# Patient Record
Sex: FEMALE | Race: BLACK OR AFRICAN AMERICAN
[De-identification: names, ages, dates, MRNs, and addresses within clinical notes are randomized per-mention and may not be internally consistent; named-entity substitution may affect disease eponyms.]

---

## 2019-10-08 ENCOUNTER — HOSPITAL ENCOUNTER (EMERGENCY)
Dept: HOSPITAL 62 - ER | Age: 24
Discharge: HOME | End: 2019-10-08
Payer: SELF-PAY

## 2019-10-08 VITALS — DIASTOLIC BLOOD PRESSURE: 88 MMHG | SYSTOLIC BLOOD PRESSURE: 124 MMHG

## 2019-10-08 DIAGNOSIS — F17.200: ICD-10-CM

## 2019-10-08 DIAGNOSIS — N92.6: ICD-10-CM

## 2019-10-08 DIAGNOSIS — R30.0: ICD-10-CM

## 2019-10-08 DIAGNOSIS — R31.9: ICD-10-CM

## 2019-10-08 DIAGNOSIS — R10.30: ICD-10-CM

## 2019-10-08 DIAGNOSIS — N39.0: Primary | ICD-10-CM

## 2019-10-08 LAB
APPEARANCE UR: (no result)
APTT PPP: (no result) S
BILIRUB UR QL STRIP: NEGATIVE
GLUCOSE UR STRIP-MCNC: NEGATIVE MG/DL
KETONES UR STRIP-MCNC: 20 MG/DL
NITRITE UR QL STRIP: POSITIVE
PH UR STRIP: 6 [PH] (ref 5–9)
PROT UR STRIP-MCNC: 100 MG/DL
SP GR UR STRIP: 1.02
UROBILINOGEN UR-MCNC: 4 MG/DL (ref ?–2)

## 2019-10-08 PROCEDURE — 99283 EMERGENCY DEPT VISIT LOW MDM: CPT

## 2019-10-08 PROCEDURE — 81025 URINE PREGNANCY TEST: CPT

## 2019-10-08 PROCEDURE — 81001 URINALYSIS AUTO W/SCOPE: CPT

## 2019-10-08 NOTE — ER DOCUMENT REPORT
ED GI/





- General


Chief Complaint: Abdominal Pain


Stated Complaint: NAUSEA,STOMACH PAIN


Time Seen by Provider: 10/08/19 03:21


Primary Care Provider: 


TRACY TONY MD [ACTIVE STAFF] - Follow up as needed


Mode of Arrival: Ambulatory


Information source: Patient, Friend


Notes: 





HISTORY OF PRESENT ILLNESS:





Patient is a 24-year-old female with no significant past medical history who 

presents with lower abdominal cramping with hematuria and dysuria for the past 2

to 3 days.  Patient reports that she is trying to get pregnant and took a home 

pregnancy test that was negative, denies vaginal bleeding or discharge.





Location: Lower abdomen


Onset: 2 days ago


Alleviation: None


Provocation: Urination


Quality: Aching, burning


Radiation: None


Severity: Mild


Timing: Constant


History of abdominal surgery: None


Associated symptoms: Denies fevers or chills, no vaginal bleeding or discharge, 

no injuries, no nausea or vomiting, no diarrhea


Last bowel movement: Today and normal


Last menstrual period: Approximately 5 weeks ago











REVIEW OF SYSTEMS:





CONSTITUTIONAL : Denies fever or chills, no sweats.  Denies recent illness.


EENT:   Denies eye, ear, throat, or mouth pain or symptoms.  Denies nasal or 

sinus congestion.


CARDIOVASCULAR: Denies chest pain.  Denies swelling of the legs.


RESPIRATORY: Denies cough, cold, or chest congestion.  Denies shortness of 

breath or difficulty breathing.  Denies wheezing.


GASTROINTESTINAL: Positive for abdominal pain.  Denies nausea, vomiting, or 

diarrhea.  Denies constipation. 


GENITOURINARY: Positive for hematuria and dysuria.


FEMALE  GENITOURINARY:  Denies vaginal bleeding, abnormal or irregular periods.


MUSCULOSKELETAL:  Denies neck or back pain or joint pain or swelling.


SKIN:   Denies rash or skin lesions.


HEMATOLOGIC :   Denies easy bruising or bleeding.


LYMPHATIC:  Denies swollen, enlarged glands.


NEUROLOGICAL:  Denies altered mental status or loss of consciousness.  Denies 

headache.  Denies weakness or paralysis or loss of use of either side.  Denies 

problems with gait or speech.  Denies sensory or motor loss.


PSYCHIATRIC:  Denies anxiety or stress or depression.





All other systems reviewed and negative.











PHYSICAL EXAMINATION:





GENERAL: Well-appearing, well-nourished and in no acute distress.


HEAD: Atraumatic, normocephalic. No scalp deformity, depression, or crepitance.


EYES: Pupils are 3 mm and equal/round/reactive to light, extraocular movements 

intact, sclera anicteric, conjunctiva are normal.


ENT: Nares patent bilaterally, oropharynx.  Moist mucous membranes. No tonsil 

hypertrophy.


NECK: Normal range of motion, supple without lymphadenopathy.


LUNGS: Breath sounds present, equal, and clear to auscultation bilaterally.  No 

wheezes, rales, or rhonchi.


HEART: Regular rate and rhythm without murmurs, rubs, or gallops.  2+ peripheral

pulses.  Normal capillary refill.


ABDOMEN: Soft, mild suprapubic tenderness, nondistended. Normoactive bowel 

sounds.  No guarding, no rebound.  No masses appreciated.


BACK: Normal contour, no midline tenderness. Rectal exam deferred.


GENITAL/PELVIC: Deferred.


EXTREMITIES: Normal range of motion, no pitting or edema.  No cyanosis.


NEUROLOGICAL: No focal neurological deficits. Moves all extremities 

spontaneously and on command.


PSYCH: Normal mood, normal affect.  No suicidal thoughts/ideations.  No 

homicidal thoughts/ideations.  No hallucinations.


SKIN: Warm, dry, normal turgor, no rashes or lesions noted.











ASSESSMENT AND PLAN:





This patient is a 24-year-old female who presents with lower abdominal cramping 

with hematuria and dysuria, most consistent with UTI versus cystitis versus 

pregnancy.





1. Will obtain urine, pregnancy test, and reassess.


2. Will treat infection as appropriate and obtain ultrasound if pregnancy test 

is positive.


TRAVEL OUTSIDE OF THE [place] IN LAST 30 DAYS: No





- HPI


Patient complains to provider of: Abdominal pain, Missed/Late menses


Onset: This morning


Timing/Duration: Sudden


Quality of pain: Achy, Cramping


Severity at maximum: Moderate


Severity in ED: Mild


Pain Level: 1


Location: Suprapubic, Pelvis


Vaginal bleeding (Compared to normal period): Spotting, Lighter


Menstrual period history: Irregular


Sexual history: Active


Associated symptoms: Nausea, Vomiting


Exacerbated by: Denies


Relieved by: Denies


Similar symptoms previously: No


Recently seen / treated by doctor: No





- Related Data


Allergies/Adverse Reactions: 


                                        





No Known Allergies Allergy (Verified 10/08/19 01:13)


   











Past Medical History





- General


Information source: Patient





- Social History


Smoking Status: Current Every Day Smoker


Chew tobacco use (# tins/day): No


Frequency of alcohol use: None


Drug Abuse: None


Lives with: Friend


Family History: Reviewed & Not Pertinent


Patient has suicidal ideation: No


Patient has homicidal ideation: No





- Past Medical History


Cardiac Medical History: Reports: None


Pulmonary Medical History: Reports: None


EENT Medical History: Reports: None


Neurological Medical History: Reports: None


Endocrine Medical History: Reports: None


Renal/ Medical History: Reports: None


Malignancy Medical History: Reports: None


GI Medical History: Reports: None


Musculoskeletal Medical History: Reports None


Skin Medical History: Reports None


Psychiatric Medical History: Reports: None


Traumatic Medical History: Reports: None


Infectious Medical History: Reports: None


Past Surgical History: Reports: Hx Kidney (Renal Surgery) - 2017





- Immunizations


Immunizations up to date: Yes


Hx Diphtheria, Pertussis, Tetanus Vaccination: Yes





Review of Systems





- Review of Systems


Constitutional: No symptoms reported


EENT: No symptoms reported


Cardiovascular: No symptoms reported


Respiratory: No symptoms reported


Gastrointestinal: See HPI, Abdominal pain, Nausea, Vomiting


Genitourinary: No symptoms reported


Female Genitourinary: See HPI, Last menstrual period, Irregular period


Musculoskeletal: No symptoms reported


Skin: No symptoms reported


Hematologic/Lymphatic: No symptoms reported


Neurological/Psychological: No symptoms reported


-: Yes All other systems reviewed and negative





Physical Exam





- Vital signs


Vitals: 


                                        











Temp Pulse Resp BP Pulse Ox


 


 98.7 F   86   16   124/88 H  100 


 


 10/08/19 00:46  10/08/19 00:46  10/08/19 00:46  10/08/19 00:46  10/08/19 00:46











Interpretation: Normal





Course





- Re-evaluation


Re-evalutation: 





10/08/19 03:56


Patient had a negative pregnancy test, however does have a nitrite positive UTI.

 She was given oral Cipro.  Will discharge the patient home with strict return 

precautions and follow-up with OB/GYN. All results were explained to and 

discussed with the patient, and all questions addressed and answered. The 

patient voices both understanding and agreeing with the plan.





- Vital Signs


Vital signs: 


                                        











Temp Pulse Resp BP Pulse Ox


 


 98.7 F   86   16   124/88 H  100 


 


 10/08/19 00:46  10/08/19 00:46  10/08/19 00:46  10/08/19 00:46  10/08/19 00:46














- Laboratory


Laboratory results interpreted by me: 


                                        











  10/08/19





  01:20


 


Urine Protein  100 H


 


Urine Ketones  20 H


 


Urine Blood  LARGE H


 


Urine Nitrite  POSITIVE H


 


Urine Urobilinogen  4.0 H


 


Ur Leukocyte Esterase  LARGE H














Discharge





- Discharge


Clinical Impression: 


Urinary tract infection


Qualifiers:


 Urinary tract infection type: site unspecified Hematuria presence: without 

hematuria Qualified Code(s): N39.0 - Urinary tract infection, site not specified





Condition: Good


Disposition: HOME, SELF-CARE


Instructions:  Urinary Tract Infection ([facility])


Additional Instructions: 


You have been evaluated in the Emergency Department for abdominal pain.  While 

here, you had urinalysis that revealed a urinary tract infection and it is now 

safe to be discharged home.  Please follow-up with your OB/GYN as instructed in 

one week to be rechecked. Return to the Emergency Department if you experience 

worsening pain, irregular periods, vaginal discharge, or any other concerning 

symptoms.


Prescriptions: 


Ondansetron [Zofran Odt 4 mg Tablet] 1 tab PO Q8HP PRN #30 tab.rapdis


 PRN Reason: For Nausea/Vomiting


Ciprofloxacin HCl [Cipro 500 mg Tablet] 500 mg PO BID #20 tablet


Phenazopyridine HCl [Pyridium 100 Mg Tablet] 100 mg PO TID #12 tablet


Referrals: 


TRACY TONY MD [ACTIVE STAFF] - Follow up as needed


Print Language: English

## 2019-12-20 ENCOUNTER — HOSPITAL ENCOUNTER (EMERGENCY)
Dept: HOSPITAL 62 - ER | Age: 24
Discharge: HOME | End: 2019-12-20
Payer: SELF-PAY

## 2019-12-20 VITALS — SYSTOLIC BLOOD PRESSURE: 132 MMHG | DIASTOLIC BLOOD PRESSURE: 52 MMHG

## 2019-12-20 DIAGNOSIS — F12.10: ICD-10-CM

## 2019-12-20 DIAGNOSIS — N94.6: Primary | ICD-10-CM

## 2019-12-20 DIAGNOSIS — M54.5: ICD-10-CM

## 2019-12-20 DIAGNOSIS — D64.9: ICD-10-CM

## 2019-12-20 DIAGNOSIS — R11.0: ICD-10-CM

## 2019-12-20 DIAGNOSIS — R10.9: ICD-10-CM

## 2019-12-20 DIAGNOSIS — F17.290: ICD-10-CM

## 2019-12-20 DIAGNOSIS — Z87.442: ICD-10-CM

## 2019-12-20 LAB
ADD MANUAL DIFF: NO
ALBUMIN SERPL-MCNC: 4.7 G/DL (ref 3.5–5)
ALP SERPL-CCNC: 104 U/L (ref 38–126)
ANION GAP SERPL CALC-SCNC: 13 MMOL/L (ref 5–19)
APPEARANCE UR: (no result)
APTT PPP: YELLOW S
AST SERPL-CCNC: 32 U/L (ref 14–36)
BASOPHILS # BLD AUTO: 0.1 10^3/UL (ref 0–0.2)
BASOPHILS NFR BLD AUTO: 0.9 % (ref 0–2)
BILIRUB DIRECT SERPL-MCNC: 0.3 MG/DL (ref 0–0.4)
BILIRUB SERPL-MCNC: 0.6 MG/DL (ref 0.2–1.3)
BILIRUB UR QL STRIP: NEGATIVE
BUN SERPL-MCNC: 12 MG/DL (ref 7–20)
CALCIUM: 9.8 MG/DL (ref 8.4–10.2)
CHLORIDE SERPL-SCNC: 105 MMOL/L (ref 98–107)
CO2 SERPL-SCNC: 22 MMOL/L (ref 22–30)
EOSINOPHIL # BLD AUTO: 0 10^3/UL (ref 0–0.6)
EOSINOPHIL NFR BLD AUTO: 0.4 % (ref 0–6)
ERYTHROCYTE [DISTWIDTH] IN BLOOD BY AUTOMATED COUNT: 20.1 % (ref 11.5–14)
GLUCOSE SERPL-MCNC: 75 MG/DL (ref 75–110)
GLUCOSE UR STRIP-MCNC: NEGATIVE MG/DL
HCT VFR BLD CALC: 32.1 % (ref 36–47)
HGB BLD-MCNC: 10.2 G/DL (ref 12–15.5)
KETONES UR STRIP-MCNC: 20 MG/DL
LYMPHOCYTES # BLD AUTO: 2.7 10^3/UL (ref 0.5–4.7)
LYMPHOCYTES NFR BLD AUTO: 33.1 % (ref 13–45)
MCH RBC QN AUTO: 23.1 PG (ref 27–33.4)
MCHC RBC AUTO-ENTMCNC: 31.7 G/DL (ref 32–36)
MCV RBC AUTO: 73 FL (ref 80–97)
MONOCYTES # BLD AUTO: 0.6 10^3/UL (ref 0.1–1.4)
MONOCYTES NFR BLD AUTO: 7.6 % (ref 3–13)
NEUTROPHILS # BLD AUTO: 4.7 10^3/UL (ref 1.7–8.2)
NEUTS SEG NFR BLD AUTO: 58 % (ref 42–78)
PH UR STRIP: 7 [PH] (ref 5–9)
PLATELET # BLD: 566 10^3/UL (ref 150–450)
POTASSIUM SERPL-SCNC: 4.1 MMOL/L (ref 3.6–5)
PROT SERPL-MCNC: 8.6 G/DL (ref 6.3–8.2)
PROT UR STRIP-MCNC: 100 MG/DL
RBC # BLD AUTO: 4.42 10^6/UL (ref 3.72–5.28)
SP GR UR STRIP: 1.02
TOTAL CELLS COUNTED % (AUTO): 100 %
UROBILINOGEN UR-MCNC: 2 MG/DL (ref ?–2)
WBC # BLD AUTO: 8.1 10^3/UL (ref 4–10.5)

## 2019-12-20 PROCEDURE — 80053 COMPREHEN METABOLIC PANEL: CPT

## 2019-12-20 PROCEDURE — 76770 US EXAM ABDO BACK WALL COMP: CPT

## 2019-12-20 PROCEDURE — 81001 URINALYSIS AUTO W/SCOPE: CPT

## 2019-12-20 PROCEDURE — 87086 URINE CULTURE/COLONY COUNT: CPT

## 2019-12-20 PROCEDURE — 96374 THER/PROPH/DIAG INJ IV PUSH: CPT

## 2019-12-20 PROCEDURE — 85025 COMPLETE CBC W/AUTO DIFF WBC: CPT

## 2019-12-20 PROCEDURE — 36415 COLL VENOUS BLD VENIPUNCTURE: CPT

## 2019-12-20 PROCEDURE — 84703 CHORIONIC GONADOTROPIN ASSAY: CPT

## 2019-12-20 PROCEDURE — 87186 SC STD MICRODIL/AGAR DIL: CPT

## 2019-12-20 PROCEDURE — 99284 EMERGENCY DEPT VISIT MOD MDM: CPT

## 2019-12-20 PROCEDURE — S0119 ONDANSETRON 4 MG: HCPCS

## 2019-12-20 PROCEDURE — 87088 URINE BACTERIA CULTURE: CPT

## 2019-12-20 NOTE — ER DOCUMENT REPORT
ED Medical Screen (RME)





- General


Chief Complaint: Abdominal Cramping


Stated Complaint: ABDOMINAL CRAMPING


Time Seen by Provider: 12/20/19 12:53


Mode of Arrival: Ambulatory


Notes: 





24-year-old female presented to ED for complaint of right flank pain.  She 

states she started her menstrual cycle this morning but this morning at work she

became very dizzy feeling hot flushed with right flank pain.  She states her 

pain right now is a level 3 and cramping.  She states she does take iron 

medications.  She states she usually has some cramping with her cycle but not 

flank pain.  Patient is tender to palpation to the right flank area.














I have greeted and performed a rapid initial assessment of this patient.  A 

comprehensive ED assessment and evaluation of the patient, analysis of test 

results and completion of medical decision making process will be conducted by 

an additional ED providers.


TRAVEL OUTSIDE OF THE [place] IN LAST 30 DAYS: No





- Related Data


Allergies/Adverse Reactions: 


                                        





No Known Allergies Allergy (Verified 10/08/19 01:13)


   











Past Medical History





- General


Information source: Patient - Small to small





- Social History


Cigarette use (# per day): No


Frequency of alcohol use: None


Drug Abuse: Marijuana


Occupation: Customer service


Lives with: Spouse/Significant other


Family history: Reviewed & Not Pertinent





- Past Medical History


Cardiac Medical History: Reports: None


Pulmonary Medical History: Reports: None


EENT Medical History: Reports: None


Neurological Medical History: Reports: None


Endocrine Medical History: Reports: None


Renal/ Medical History: Reports: Hx Kidney Stones


Malignancy Medical History: Reports: None


GI Medical History: Reports: None


Musculoskeltal Medical History: Reports None


Skin Medical History: Reports None


Psychiatric Medical History: Reports: None


Traumatic Medical History: Reports: None


Infectious Medical History: Reports: None


Past Surgical History: Reports: Hx Kidney (Renal Surgery) - 2017 with episodes 

kidney stone





- Immunizations


Immunizations up to date: Yes


Hx Diphtheria, Pertussis, Tetanus Vaccination: Yes





Physical Exam





- Vital signs


Vitals: 





                                        











Temp Pulse Resp BP Pulse Ox


 


 98.0 F   86   18   132/52 H  100 


 


 12/20/19 12:23  12/20/19 12:23  12/20/19 12:23  12/20/19 12:23  12/20/19 12:23














Course





- Vital Signs


Vital signs: 





                                        











Temp Pulse Resp BP Pulse Ox


 


 98.0 F   86   18   132/52 H  100 


 


 12/20/19 12:23  12/20/19 12:23  12/20/19 12:23  12/20/19 12:23  12/20/19 12:23

## 2019-12-20 NOTE — RADIOLOGY REPORT (SQ)
EXAM DESCRIPTION:  U/S RETROPERITON (RENAL/AORTA)



COMPLETED DATE/TIME:  12/20/2019 2:12 pm



REASON FOR STUDY:  right flank pain



COMPARISON:  None.



TECHNIQUE:  Dynamic and static grayscale images acquired of the kidneys and bladder and recorded on P
ACS. Additional selected color Doppler and spectral images recorded.



LIMITATIONS:  None.



FINDINGS:  RIGHT KIDNEY: The right kidney measures 9.2 cm in length.  The echotexture of the parenchy
ma is normal and the corticomedullary differentiation is preserved.  There is no hydronephrosis, mass
 or calcification.

LEFT KIDNEY:  The left kidney measures 8.6 cm in length.  The echotexture of the parenchyma is normal
 and the corticomedullary differentiation is preserved.  There is no hydronephrosis, mass or calcific
ation.

BLADDER: Limited evaluation of the nondistended urinary bladder.

OTHER FINDINGS: No other finding.



IMPRESSION:  1. No abnormality of the kidneys.

2. Limited evaluation of the nondistended urinary bladder.



TECHNICAL DOCUMENTATION:  JOB ID:  [id number]

 2011 [employer]- All Rights Reserved



Reading location - IP/workstation name: [email or web address]

## 2019-12-20 NOTE — ER DOCUMENT REPORT
ED General





- General


Chief Complaint: Flank Pain


Stated Complaint: ABDOMINAL CRAMPING


Time Seen by Provider: 19 12:53


Mode of Arrival: Ambulatory


TRAVEL OUTSIDE OF THE [place] IN LAST 30 DAYS: No





- HPI


Notes: 





24 year old female to the ED with C/O right flank/low back pain that began today

alongside her menstrual cycle.   Patient states that she typically gets lower 

abdominal cramping with her cycle, but never back pain.  Admits to a history of 

kidney stones, but states that this pain is not as severe as when she had kidney

stones.  She states that she could be pregnant.  She is a [id number].  She denies 

fevers, chills. Admits to nausea, denies vomiting.  Denies urinary complaints or

vaginal discharge. 





- Related Data


Allergies/Adverse Reactions: 


                                        





No Known Allergies Allergy (Verified 19 12:55)


   











Past Medical History





- General


Information source: Patient





- Social History


Smoking Status: Current Some Day Smoker - black and milds


Cigarette use (# per day): No


Chew tobacco use (# tins/day): No


Frequency of alcohol use: None


Drug Abuse: Marijuana


Occupation: Customer service


Lives with: Spouse/Significant other


Family History: Reviewed & Not Pertinent


Patient has suicidal ideation: No


Patient has homicidal ideation: No





- Past Medical History


Cardiac Medical History: Reports: None


Pulmonary Medical History: Reports: None


EENT Medical History: Reports: None


Neurological Medical History: Reports: None


Endocrine Medical History: Reports: None


Renal/ Medical History: Reports: Hx Kidney Stones


Malignancy Medical History: Reports: None


GI Medical History: Reports: None


Musculoskeletal Medical History: Reports None


Skin Medical History: Reports None


Psychiatric Medical History: Reports: None


Traumatic Medical History: Reports: None


Infectious Medical History: Reports: None


Past Surgical History: Reports: Hx Kidney (Renal Surgery) - 2017 with episodes 

kidney stone





- Immunizations


Immunizations up to date: Yes


Hx Diphtheria, Pertussis, Tetanus Vaccination: Yes





Review of Systems





- Review of Systems


Constitutional: denies: Chills, Fever


EENT: No symptoms reported


Cardiovascular: denies: Chest pain, Palpitations, Dyspnea, Syncope, Dizziness


Respiratory: denies: Cough, Short of breath


Gastrointestinal: Abdominal pain, Nausea.  denies: Diarrhea, Vomiting


Genitourinary: See HPI, Flank pain


Female Genitourinary: See HPI, Vaginal bleeding


Musculoskeletal: No symptoms reported


Skin: No symptoms reported


Hematologic/Lymphatic: No symptoms reported


Neurological/Psychological: No symptoms reported


-: Yes All other systems reviewed and negative





Physical Exam





- Vital signs


Vitals: 


                                        











Temp Pulse Resp BP Pulse Ox


 


 98.0 F   86   18   132/52 H  100 


 


 19 12:23  19 12:23  19 12:23  19 12:23  19 12:23











Interpretation: Normal





- General


General appearance: Appears well, Alert


In distress: None





- HEENT


Head: Normocephalic, Atraumatic


Eyes: Normal


Pupils: PERRL


Neck: Normal, Supple





- Respiratory


Respiratory status: No respiratory distress


Chest status: Nontender.  No: Accessory muscle use


Breath sounds: Normal.  No: Rales, Rhonchi, Wheezing


Chest palpation: Normal





- Cardiovascular


Rhythm: Regular


Heart sounds: Normal auscultation


Murmur: No





- Abdominal


Inspection: Normal


Distension: No distension


Bowel sounds: Normal


Tenderness: Tender - patient reports mild discomfort with palpation to the lower

abdomen.  She has no rebound or guarding.  She is not particularly focally 

tendern.  negative CVA.


Organomegaly: No organomegaly





- Back


Back: Normal, Tender - mild TTP over the right lower back with no bony TTP..  

No: Deformity/step-off, CVA tenderness, Vertebra tenderness





- Extremities


General upper extremity: Normal inspection, Nontender, Normal color, Normal ROM,

Normal temperature


General lower extremity: Normal inspection, Nontender, Normal color, Normal ROM,

Normal temperature, Normal weight bearing





- Neurological


Neuro grossly intact: Yes


Cognition: Normal


Orientation: AAOx4


Kerrick Coma Scale Eye Opening: Spontaneous


Kerrick Coma Scale Verbal: Oriented


Kerrick Coma Scale Motor: Obeys Commands


Kerrick Coma Scale Total: 15


Speech: Normal


Cranial nerves: Normal


Cerebellar coordination: Normal


Motor strength normal: LUE, RUE, LLE, RLE


Additional motor exam normals: Equal .  No: Pronator drift


Sensory: Normal





- Psychological


Associated symptoms: Normal affect, Normal mood





- Skin


Skin Temperature: Warm


Skin Moisture: Dry


Skin Color: Normal





Course





- Re-evaluation


Re-evalutation: 





19 1


Impression:  Low back pain, dysmenorrhea, anemia.   Will discharge home with 

pain meds and muscle relaxants.  Patient agrees with the plan.   Negative 

pregnancy. PCP follow up.  Gave return precautions.  








- Vital Signs


Vital signs: 


                                        











Temp Pulse Resp BP Pulse Ox


 


 98.0 F   86   18   132/52 H  100 


 


 19 12:55  19 12:55  19 12:55  19 12:55  19 12:55














- Laboratory


Result Diagrams: 


                                 19 14:25





                                 19 14:25


Laboratory results interpreted by me: 


                                        











  19





  13:01 14:25 14:25


 


Hgb   10.2 L 


 


Hct   32.1 L 


 


MCV   73 L 


 


MCH   23.1 L 


 


MCHC   31.7 L 


 


RDW   20.1 H 


 


Plt Count   566 H 


 


Total Protein    8.6 H


 


Urine Protein  100 H  


 


Urine Ketones  20 H  


 


Urine Blood  LARGE H  


 


Urine Urobilinogen  2.0 H  


 


Leukocyte Esterase Rfl  TRACE H  














- Diagnostic Test


Radiology reviewed: Image reviewed, Reports reviewed





Discharge





- Discharge


Clinical Impression: 


 Dysmenorrhea





Low back pain


Qualifiers:


 Chronicity: acute Back pain laterality: right Sciatica presence: without 

sciatica Qualified Code(s): M54.5 - Low back pain





Condition: Stable


Disposition: HOME, SELF-CARE


Instructions:  Dysmenorrhea ([facility])


Additional Instructions: 


TAKE MEDICINES AS PRESCRIBED.  APPLY WARM COMPRESSES.  RETURN IF SYMPTOMS 

WORSENING.  FOLLOW UP WITH PRIMARY CARE. 


Prescriptions: 


Ondansetron [Zofran Odt 4 mg Tablet] 1 - 2 tab PO Q4HP PRN #10 tab.rapdis


 PRN Reason: 


Etodolac 200 mg PO BID #16 capsule


Methocarbamol [Robaxin 500 mg Tablet] 500 mg PO QID #20 tablet


Forms:  Return to Work


Referrals: 


[facility] [Provider Group] - Follow up in 3-5 days

## 2020-01-14 ENCOUNTER — HOSPITAL ENCOUNTER (EMERGENCY)
Dept: HOSPITAL 62 - ER | Age: 25
Discharge: HOME | End: 2020-01-14
Payer: SELF-PAY

## 2020-01-14 VITALS — DIASTOLIC BLOOD PRESSURE: 71 MMHG | SYSTOLIC BLOOD PRESSURE: 125 MMHG

## 2020-01-14 DIAGNOSIS — Z3A.00: ICD-10-CM

## 2020-01-14 DIAGNOSIS — Z32.01: Primary | ICD-10-CM

## 2020-01-14 DIAGNOSIS — O21.9: ICD-10-CM

## 2020-01-14 PROCEDURE — 81025 URINE PREGNANCY TEST: CPT

## 2020-01-14 PROCEDURE — 99284 EMERGENCY DEPT VISIT MOD MDM: CPT

## 2020-01-14 NOTE — ER DOCUMENT REPORT
HPI





- HPI


Time Seen by Provider: 01/14/20 17:04


Notes: 





Patient is a 24-year-old female G2, P0 approximately 3 to 4 weeks pregnant by 

gestation presents requesting confirmatory testing of pregnancy for her 

Medicaid.  Patient states that she had a positive urine pregnancy yesterday at 

home.  She has had some nausea and vomiting in the mornings recently, but is 

able to eat and drink without difficulty otherwise.  She is urinating normally 

and having normal bowel movements.  No vaginal discharge, odor, or bleeding.  

Denies drug allergies.  Denies any headache, fever, neck pain, URI, sore throat,

chest pain, palpitations, syncope, cough, shortness of breath, wheeze, dyspnea, 

abdominal pain, nausea/vomiting/diarrhea, urinary retention, dysuria, hematuria,

back pain, or rash.





- ROS


Systems Reviewed and Negative: Yes All other systems reviewed and negative





- REPRODUCTIVE


Reproductive: DENIES: Pregnant:





Past Medical History





- Social History


Smoking Status: Unknown if Ever Smoked


Family History: Reviewed & Not Pertinent


Renal/ Medical History: Reports: Hx Kidney Stones


Past Surgical History: Reports: Hx Kidney (Renal Surgery) - 2017 with episodes 

kidney stone





- Immunizations


Immunizations up to date: Yes


Hx Diphtheria, Pertussis, Tetanus Vaccination: Yes





Vertical Provider Document





- CONSTITUTIONAL


Agree With Documented VS: Yes


Notes: 





PHYSICAL EXAMINATION:





GENERAL: Well-appearing, well-nourished and in no acute distress.





LUNGS: Breath sounds clear to auscultation bilaterally and equal.  No wheezes ra

les or rhonchi.





HEART: Regular rate and rhythm without murmurs, rubs, gallops.





ABDOMEN: Soft, nontender, nondistended abdomen.  No guarding, no rebound.  

Normal bowel sounds present.  No CVA tenderness bilaterally.





Musculoskeletal: FROM to passive/active. Strength 5+/5. 





Extremities:  No cyanosis, clubbing, or edema b/l.  Peripheral pulses 2+.  

Capillary refill less than 3 seconds.





NEUROLOGICAL:  Normal speech, normal gait.





PSYCH: Normal mood, normal affect.





SKIN: Warm, Dry, normal turgor, no rashes or lesions noted.





- INFECTION CONTROL


TRAVEL OUTSIDE OF THE [place] IN LAST 30 DAYS: No





Course





- Re-evaluation


Re-evalutation: 





01/14/20 


Patient is an afebrile, well-hydrated, 22-year-old female presents with positive

pregnancy test in early pregnancy.  She is otherwise asymptomatic aside from her

nausea and vomiting in the mornings.  Vitals acceptable without significant 

tachycardia, tachypnea, hypoxia.  PE is otherwise unremarkable.  Patient's 

abdomen is soft and nontender throughout.  She is nontoxic-appearing and is 

tolerating p.o. without incident.  Urine pregnancy test was positive.  Low 

suspicion/risk for acute appendicitis, bowel obstruction, acute cholecystitis, 

acute cholangitis, perforated diverticulitis, incarcerated hernia, pancreatitis,

perforated ulcer, peritonitis, sepsis, pelvic inflammatory disease, or other 

systemic emergent condition at this time.  Patient is aware that her condition 

can change from initial presentation and she needs to monitor symptoms closely 

and seek medical attention if any acute changes.  Conservative measures 

otherwise for symptoms.  Recheck with OBGYN in 1-2 days.  Recheck with your PCM 

in 3-5 days.  See the health dept in the next week. Return to the ED with any 

worsening/concerning symptoms otherwise as reviewed in discharge.  Patient is in

agreement.





- Vital Signs


Vital signs: 


                                        











Temp Pulse Resp BP Pulse Ox


 


 98.4 F   92   20   114/67   100 


 


 01/14/20 16:46  01/14/20 16:46  01/14/20 16:46  01/14/20 16:46  01/14/20 16:46














Discharge





- Discharge


Clinical Impression: 


 Positive pregnancy test





Condition: Stable


Disposition: HOME, SELF-CARE


Additional Instructions: 


Maintain fluid intake


Proper hygienic technique


Keep the skin clean


Tylenol as needed


F/u with your PCM in 3-5 days for a recheck


To the health department next week and/or OB/GYN


Return to the ED with any development of HA/fever, trouble with vision, eye 

redness, worsening pain, urethral discharge, urinary retention, blood in the 

urine, flank pain, abdominal pain, n/v, Chest Pain, shortness of breath, joint 

pains, trouble breathing, or any other worsening/concerning symptoms as needed 

otherwise.


Referrals: 


[facility] [Provider Group[facility] - [facility] as needed


[facility]

## 2020-02-11 ENCOUNTER — HOSPITAL ENCOUNTER (OUTPATIENT)
Dept: HOSPITAL 62 - RAD | Age: 25
End: 2020-02-11
Attending: NURSE PRACTITIONER
Payer: MEDICAID

## 2020-02-11 DIAGNOSIS — Z34.81: Primary | ICD-10-CM

## 2020-02-11 PROCEDURE — 76801 OB US < 14 WKS SINGLE FETUS: CPT

## 2020-02-11 NOTE — RADIOLOGY REPORT (SQ)
EXAM DESCRIPTION:  U/S [id number] TRNABD 1GES W/ODOP



COMPLETED DATE/TIME:  2/11/2020 9:58 am



REASON FOR STUDY:  ENCOUNTER FOR SUPERVISON OF OTHER NORMAL PREGNANCY, FIRST TRIMESTER Z34.81  ENCOUN
TER FOR SUPRVSN OF NORMAL PREGNANCY, FIRST TRIM



COMPARISON:  None.



TECHNIQUE:  Transabdominal static and realtime grayscale images acquired of the pelvis. Additional se
lected spectral and color Doppler images recorded. All images stored on PACs.

CLINICAL AGE: TONI:  9/22/2018.  EGA:  8 weeks 0 days.

bHCG: Not available.



LIMITATIONS:  None.



FINDINGS:  UTERUS:  The uterus measures 8.1 x 6.9 x 6.3 cm.  The uterus is retroverted in appearance.
 No masses.  No anomalies.

EGA:  8 weeks 0 days

TONI:  9/22/2018

GESTATIONAL SAC: Normal shape.

CROWN-RUMP LENGTH:  1.59 cm.

YOLK SAC: Yes.

FETAL POLE:  Single living intrauterine pregnancy.

FETAL HEART RATE:  163 beats per minute.

RIGHT ADNEXA:  The right ovary measures 4.2 x 2.8 x 2.1 cm.  Normal ovary with normal vascular flow.

No adnexal free fluid.

No adnexal masses.

LEFT ADNEXA:  The left ovary measures 2.8 x 2.3 x 2.1 cm.  Normal ovary with normal vascular flow.

No adnexal free fluid.

No adnexal masses.

FREE FLUID: None.

OTHER:  A 7 x 7 x 13 mm subchorionic bleed is suggested.

The cervical length is 1.7 cm.



IMPRESSION:  SINGLE LIVING  INTRAUTERINE PREGNANCY.

EGA:  8 weeks 0 days

Small subchorionic bleed is suggested.

Trimester of pregnancy: First trimester - 0 to 13 weeks.



TECHNICAL DOCUMENTATION:  JOB ID:  [id number]

 2011 [employer]- All Rights Reserved



Reading location - IP/workstation name: [email or web address]

## 2020-03-10 ENCOUNTER — HOSPITAL ENCOUNTER (EMERGENCY)
Dept: HOSPITAL 62 - ER | Age: 25
Discharge: HOME | End: 2020-03-10
Payer: MEDICAID

## 2020-03-10 VITALS — DIASTOLIC BLOOD PRESSURE: 69 MMHG | SYSTOLIC BLOOD PRESSURE: 107 MMHG

## 2020-03-10 DIAGNOSIS — E86.0: ICD-10-CM

## 2020-03-10 DIAGNOSIS — O26.891: ICD-10-CM

## 2020-03-10 DIAGNOSIS — I49.3: ICD-10-CM

## 2020-03-10 DIAGNOSIS — O99.281: ICD-10-CM

## 2020-03-10 DIAGNOSIS — Z79.899: ICD-10-CM

## 2020-03-10 DIAGNOSIS — R00.0: ICD-10-CM

## 2020-03-10 DIAGNOSIS — R63.0: ICD-10-CM

## 2020-03-10 DIAGNOSIS — Z3A.11: ICD-10-CM

## 2020-03-10 DIAGNOSIS — O21.9: Primary | ICD-10-CM

## 2020-03-10 DIAGNOSIS — O99.411: ICD-10-CM

## 2020-03-10 DIAGNOSIS — R09.89: ICD-10-CM

## 2020-03-10 DIAGNOSIS — R05: ICD-10-CM

## 2020-03-10 DIAGNOSIS — E87.6: ICD-10-CM

## 2020-03-10 LAB
ADD MANUAL DIFF: NO
ALBUMIN SERPL-MCNC: 4.8 G/DL (ref 3.5–5)
ALP SERPL-CCNC: 114 U/L (ref 38–126)
ANION GAP SERPL CALC-SCNC: 16 MMOL/L (ref 5–19)
APPEARANCE UR: (no result)
APTT PPP: (no result) S
AST SERPL-CCNC: 29 U/L (ref 14–36)
BASOPHILS # BLD AUTO: 0 10^3/UL (ref 0–0.2)
BASOPHILS NFR BLD AUTO: 0.4 % (ref 0–2)
BILIRUB DIRECT SERPL-MCNC: 0.1 MG/DL (ref 0–0.4)
BILIRUB SERPL-MCNC: 0.6 MG/DL (ref 0.2–1.3)
BILIRUB UR QL STRIP: NEGATIVE
BUN SERPL-MCNC: 9 MG/DL (ref 7–20)
CALCIUM: 10 MG/DL (ref 8.4–10.2)
CHLORIDE SERPL-SCNC: 96 MMOL/L (ref 98–107)
CO2 SERPL-SCNC: 26 MMOL/L (ref 22–30)
EOSINOPHIL # BLD AUTO: 0 10^3/UL (ref 0–0.6)
EOSINOPHIL NFR BLD AUTO: 0.3 % (ref 0–6)
ERYTHROCYTE [DISTWIDTH] IN BLOOD BY AUTOMATED COUNT: 18.1 % (ref 11.5–14)
GLUCOSE SERPL-MCNC: 119 MG/DL (ref 75–110)
GLUCOSE UR STRIP-MCNC: NEGATIVE MG/DL
HCT VFR BLD CALC: 36.8 % (ref 36–47)
HGB BLD-MCNC: 12.2 G/DL (ref 12–15.5)
KETONES UR STRIP-MCNC: 20 MG/DL
LYMPHOCYTES # BLD AUTO: 1.6 10^3/UL (ref 0.5–4.7)
LYMPHOCYTES NFR BLD AUTO: 13.2 % (ref 13–45)
MCH RBC QN AUTO: 24.8 PG (ref 27–33.4)
MCHC RBC AUTO-ENTMCNC: 33.3 G/DL (ref 32–36)
MCV RBC AUTO: 75 FL (ref 80–97)
MONOCYTES # BLD AUTO: 1.2 10^3/UL (ref 0.1–1.4)
MONOCYTES NFR BLD AUTO: 10.2 % (ref 3–13)
NEUTROPHILS # BLD AUTO: 9.2 10^3/UL (ref 1.7–8.2)
NEUTS SEG NFR BLD AUTO: 75.9 % (ref 42–78)
NITRITE UR QL STRIP: NEGATIVE
PH UR STRIP: 5 [PH] (ref 5–9)
PLATELET # BLD: 639 10^3/UL (ref 150–450)
POTASSIUM SERPL-SCNC: 2.9 MMOL/L (ref 3.6–5)
PROT SERPL-MCNC: 9.4 G/DL (ref 6.3–8.2)
PROT UR STRIP-MCNC: 100 MG/DL
RBC # BLD AUTO: 4.94 10^6/UL (ref 3.72–5.28)
SP GR UR STRIP: 1.02
TOTAL CELLS COUNTED % (AUTO): 100 %
UROBILINOGEN UR-MCNC: 4 MG/DL (ref ?–2)
WBC # BLD AUTO: 12.2 10^3/UL (ref 4–10.5)

## 2020-03-10 PROCEDURE — 93005 ELECTROCARDIOGRAM TRACING: CPT

## 2020-03-10 PROCEDURE — 85025 COMPLETE CBC W/AUTO DIFF WBC: CPT

## 2020-03-10 PROCEDURE — 36415 COLL VENOUS BLD VENIPUNCTURE: CPT

## 2020-03-10 PROCEDURE — 96361 HYDRATE IV INFUSION ADD-ON: CPT

## 2020-03-10 PROCEDURE — 87086 URINE CULTURE/COLONY COUNT: CPT

## 2020-03-10 PROCEDURE — 87088 URINE BACTERIA CULTURE: CPT

## 2020-03-10 PROCEDURE — 99284 EMERGENCY DEPT VISIT MOD MDM: CPT

## 2020-03-10 PROCEDURE — 96374 THER/PROPH/DIAG INJ IV PUSH: CPT

## 2020-03-10 PROCEDURE — 96375 TX/PRO/DX INJ NEW DRUG ADDON: CPT

## 2020-03-10 PROCEDURE — 81001 URINALYSIS AUTO W/SCOPE: CPT

## 2020-03-10 PROCEDURE — 80053 COMPREHEN METABOLIC PANEL: CPT

## 2020-03-10 PROCEDURE — 87186 SC STD MICRODIL/AGAR DIL: CPT

## 2020-03-10 PROCEDURE — 93010 ELECTROCARDIOGRAM REPORT: CPT

## 2020-03-10 NOTE — ER DOCUMENT REPORT
Entered by LARRY HOBBS SCRIBE  03/10/20 0443 





Acting as scribe for:ROMULO JAMES, DO





ED GI/





- General


Mode of Arrival: Ambulatory


Information source: Patient


TRAVEL OUTSIDE OF THE [place] IN LAST 30 DAYS: No





<ROMULO JAMES - Last Filed: 03/10/20 06:10>





<KEVIN CAMPOS P - Last Filed: 03/10/20 07:55>





- General


Chief Complaint: Nausea/Vomiting


Stated Complaint: VOMITING AND NAUSEA/3 MO PREG


Time Seen by Provider: 03/10/20 04:41


Primary Care Provider: 


TIFFANIE GONZALES ARNP [Primary Care Provider] - Follow up as needed


Notes: 





This 24 year old female patient G2 A1, approximately x3 months pregnant presents

to the ED today with complaints of nausea and x15-20 episodes of vomiting that 

occurred prior to arrival. Patient states that she has been having nausea and 

vomiting since she found out she was pregnant, but is was tonight. Patient notes

that was taking Zofran prescribed by her OBGYN, but states that her insurance is

no longer paying for it. Patient states that she is unable to tolerate PO intake

and has a cough and runny nose. Patient notes her due date is September 22nd and

that she does take prenatal vitamins. Patient's last menstrual period was 

12/17/19. Patient denies constipation, abdominal pain, syncope, burning with 

urination, dysuria, hematuria, or sore throat. (ROMULO JAMES)





- Related Data


Allergies/Adverse Reactions: 


                                        





No Known Allergies Allergy (Verified 01/14/20 17:04)


   











Past Medical History





- General


Information source: Patient





- Social History


Smoking Status: Never Smoker


Cigarette use (# per day): No


Chew tobacco use (# tins/day): No


Smoking Education Provided: No


Frequency of alcohol use: None


Drug Abuse: None


Family History: Reviewed & Not Pertinent


Patient has suicidal ideation: No


Patient has homicidal ideation: No


Renal/ Medical History: Reports: Hx Kidney Stones


Past Surgical History: Reports: Hx Kidney (Renal Surgery) - 2017 with episodes 

kidney stone





- Immunizations


Immunizations up to date: Yes


Hx Diphtheria, Pertussis, Tetanus Vaccination: Yes





<ROMULO JAMES - Last Filed: 03/10/20 06:10>





Review of Systems





- Review of Systems


Constitutional: No symptoms reported


EENT: See HPI, Nose discharge.  denies: Throat pain


Cardiovascular: See HPI.  denies: Syncope


Respiratory: See HPI, Cough


Gastrointestinal: See HPI, Nausea, Vomiting, Poor appetite, Poor fluid intake.  

denies: Constipation


Genitourinary: See HPI.  denies: Burning, Dysuria, Hematuria


Female Genitourinary: See HPI, Last menstrual period - 12/17/19, Pregnant - 

approximately x3 months


Musculoskeletal: No symptoms reported


Skin: No symptoms reported


Hematologic/Lymphatic: No symptoms reported


Neurological/Psychological: No symptoms reported


-: Yes All other systems reviewed and negative





<ROMULO JAMES P - Last Filed: 03/10/20 06:10>





Physical Exam





- General


General appearance: Appears well, Alert


In distress: None





- HEENT


Head: Normocephalic, Atraumatic


Eyes: Normal


Pupils: PERRL


Mucous membranes: Dry - Mildly





- Respiratory


Respiratory status: No respiratory distress


Chest status: Nontender


Breath sounds: Normal


Chest palpation: Normal





- Cardiovascular


Rhythm: Regular


Heart sounds: Normal auscultation


Murmur: No





- Abdominal


Inspection: Normal


Distension: No distension


Bowel sounds: Normal


Tenderness: Nontender - Abdomen soft


Organomegaly: No organomegaly





- Back


Back: Normal, Nontender





- Extremities


General upper extremity: Normal inspection


General lower extremity: Normal inspection





- Neurological


Neuro grossly intact: Yes





- Psychological


Associated symptoms: Normal affect, Normal mood





- Skin


Skin Temperature: Warm


Skin Moisture: Dry


Skin Color: Normal





<ROMULO JAMES P - Last Filed: 03/10/20 06:10>





- Vital signs


Vitals: 





                                        











Temp Pulse Resp BP Pulse Ox


 


 98.4 F   136 H  18   119/77   97 


 


 03/10/20 03:50  03/10/20 03:50  03/10/20 03:50  03/10/20 03:50  03/10/20 03:50














Course





- Laboratory


Result Diagrams: 


                                 03/10/20 04:07





                                 03/10/20 04:07





- EKG Interpretation by Me


EKG shows normal: Sinus rhythm - Sinus Tachy PVC Repol No st elevaiton or 

depression my interpretation.





<ROMULO JAMES P - Last Filed: 03/10/20 06:10>





- Laboratory


Result Diagrams: 


                                 03/10/20 04:07





                                 03/10/20 04:07





<KEVIN CAMPOS P - Last Filed: 03/10/20 07:55>





- Re-evaluation


Re-evalutation: 





03/10/20 05:59


MDM  24 year old at approximately 12 weeks gestation.  IVF infusing.  Over to 

ib at 0600.  (ROMULO JAMES)





03/10/20 07:54


Reevaluation patient improved.  Patient given additional antiemetics, IV 

dextrose.  Feeling improved.





Patient's urine shows mild ketones.  Presume this will clear with the edition 

dextrose given IV.





Patient markedly improved on my evaluation stable vitals within normal limits.





Will be discharged home improved with more oral antiemetics follow-up OB/GYN 

return if anything worsens or changes.





Level Service Dr. Kevin Campos (KEVIN CAMPOS)





- Vital Signs


Vital signs: 





                                        











Temp Pulse Resp BP Pulse Ox


 


 98.4 F   136 H  12   114/90 H  97 


 


 03/10/20 03:50  03/10/20 03:50  03/10/20 07:40  03/10/20 07:40  03/10/20 07:40














- Laboratory


Laboratory results interpreted by me: 





                                        











  03/10/20 03/10/20 03/10/20





  04:07 04:07 07:25


 


WBC  12.2 H  


 


MCV  75 L  


 


MCH  24.8 L  


 


RDW  18.1 H  


 


Plt Count  639 H  


 


Absolute Neuts (auto)  9.2 H  


 


Potassium   2.9 L* 


 


Chloride   96 L 


 


Glucose   119 H 


 


Total Protein   9.4 H 


 


Urine Protein    100 H


 


Urine Ketones    20 H


 


Urine Blood    SMALL H


 


Urine Urobilinogen    4.0 H


 


Ur Leukocyte Esterase    LARGE H














Discharge





<ROMULO JAMES P - Last Filed: 03/10/20 06:10>





<KEVIN CAMPOS P - Last Filed: 03/10/20 07:55>





- Discharge


Clinical Impression: 


 Vomiting affecting pregnancy, Dehydration, Hypokalemia





Condition: Good


Disposition: HOME, SELF-CARE


Instructions:  Antinausea Medication ([facility]), Intravenous (IV) Fluids ([facility]), 

Vomiting ([facility])


Additional Instructions: 


Call the ob doctor for follow up.  Take your medicine as directed.  Please 

return here for any problems or any concerns. 


Prescriptions: 


Ondansetron [Zofran Odt 4 mg Tablet] 1 - 2 tab PO Q4HP PRN #10 tab.rapdis


 PRN Reason: 


Promethazine HCl 12.5 mg RC TID #10 supp.rect


Referrals: 


TIFFANIE GONZALES ARNP [Primary Care Provider] - Follow up as needed





I personally performed the services described in the documentation, reviewed and

edited the documentation which was dictated to the scribe in my presence, and it

accurately records my words and actions.

## 2020-03-10 NOTE — EKG REPORT
SEVERITY:- ABNORMAL ECG -

SINUS TACHYCARDIA

MULTIPLE VENTRICULAR PREMATURE COMPLEXES

ABNORMAL T, CONSIDER ISCHEMIA, INFERIOR LEADS

BORDERLINE PROLONGED QT INTERVAL

:

Confirmed by: Shena Garvin 10-Mar-2020 11:42:52

## 2020-03-26 ENCOUNTER — HOSPITAL ENCOUNTER (EMERGENCY)
Dept: HOSPITAL 62 - ER | Age: 25
LOS: 1 days | Discharge: HOME | End: 2020-03-27
Payer: MEDICAID

## 2020-03-26 DIAGNOSIS — Z3A.00: ICD-10-CM

## 2020-03-26 DIAGNOSIS — R00.0: ICD-10-CM

## 2020-03-26 DIAGNOSIS — O23.40: ICD-10-CM

## 2020-03-26 DIAGNOSIS — Z87.891: ICD-10-CM

## 2020-03-26 DIAGNOSIS — O26.899: ICD-10-CM

## 2020-03-26 DIAGNOSIS — O21.9: Primary | ICD-10-CM

## 2020-03-26 PROCEDURE — 80053 COMPREHEN METABOLIC PANEL: CPT

## 2020-03-26 PROCEDURE — 87088 URINE BACTERIA CULTURE: CPT

## 2020-03-26 PROCEDURE — 36415 COLL VENOUS BLD VENIPUNCTURE: CPT

## 2020-03-26 PROCEDURE — 96375 TX/PRO/DX INJ NEW DRUG ADDON: CPT

## 2020-03-26 PROCEDURE — 87186 SC STD MICRODIL/AGAR DIL: CPT

## 2020-03-26 PROCEDURE — 96376 TX/PRO/DX INJ SAME DRUG ADON: CPT

## 2020-03-26 PROCEDURE — 85025 COMPLETE CBC W/AUTO DIFF WBC: CPT

## 2020-03-26 PROCEDURE — 81001 URINALYSIS AUTO W/SCOPE: CPT

## 2020-03-26 PROCEDURE — 96361 HYDRATE IV INFUSION ADD-ON: CPT

## 2020-03-26 PROCEDURE — 96365 THER/PROPH/DIAG IV INF INIT: CPT

## 2020-03-26 PROCEDURE — 99284 EMERGENCY DEPT VISIT MOD MDM: CPT

## 2020-03-26 PROCEDURE — 87086 URINE CULTURE/COLONY COUNT: CPT

## 2020-03-27 VITALS — DIASTOLIC BLOOD PRESSURE: 69 MMHG | SYSTOLIC BLOOD PRESSURE: 118 MMHG

## 2020-03-27 LAB
ADD MANUAL DIFF: NO
ALBUMIN SERPL-MCNC: 4.6 G/DL (ref 3.5–5)
ALP SERPL-CCNC: 84 U/L (ref 38–126)
ANION GAP SERPL CALC-SCNC: 15 MMOL/L (ref 5–19)
APPEARANCE UR: (no result)
APTT PPP: (no result) S
AST SERPL-CCNC: 27 U/L (ref 14–36)
BASOPHILS # BLD AUTO: 0 10^3/UL (ref 0–0.2)
BASOPHILS NFR BLD AUTO: 0.4 % (ref 0–2)
BILIRUB DIRECT SERPL-MCNC: 0.5 MG/DL (ref 0–0.4)
BILIRUB SERPL-MCNC: 0.7 MG/DL (ref 0.2–1.3)
BILIRUB UR QL STRIP: NEGATIVE
BUN SERPL-MCNC: 12 MG/DL (ref 7–20)
CALCIUM: 10 MG/DL (ref 8.4–10.2)
CHLORIDE SERPL-SCNC: 100 MMOL/L (ref 98–107)
CO2 SERPL-SCNC: 19 MMOL/L (ref 22–30)
EOSINOPHIL # BLD AUTO: 0 10^3/UL (ref 0–0.6)
EOSINOPHIL NFR BLD AUTO: 0.1 % (ref 0–6)
ERYTHROCYTE [DISTWIDTH] IN BLOOD BY AUTOMATED COUNT: 18.3 % (ref 11.5–14)
GLUCOSE SERPL-MCNC: 89 MG/DL (ref 75–110)
GLUCOSE UR STRIP-MCNC: NEGATIVE MG/DL
HCT VFR BLD CALC: 33.8 % (ref 36–47)
HGB BLD-MCNC: 11 G/DL (ref 12–15.5)
KETONES UR STRIP-MCNC: 80 MG/DL
LYMPHOCYTES # BLD AUTO: 1.4 10^3/UL (ref 0.5–4.7)
LYMPHOCYTES NFR BLD AUTO: 13.2 % (ref 13–45)
MCH RBC QN AUTO: 25.2 PG (ref 27–33.4)
MCHC RBC AUTO-ENTMCNC: 32.6 G/DL (ref 32–36)
MCV RBC AUTO: 77 FL (ref 80–97)
MONOCYTES # BLD AUTO: 1 10^3/UL (ref 0.1–1.4)
MONOCYTES NFR BLD AUTO: 9 % (ref 3–13)
NEUTROPHILS # BLD AUTO: 8.4 10^3/UL (ref 1.7–8.2)
NEUTS SEG NFR BLD AUTO: 77.3 % (ref 42–78)
NITRITE UR QL STRIP: POSITIVE
PH UR STRIP: 5 [PH] (ref 5–9)
PLATELET # BLD: 587 10^3/UL (ref 150–450)
POTASSIUM SERPL-SCNC: 4.6 MMOL/L (ref 3.6–5)
PROT SERPL-MCNC: 8.9 G/DL (ref 6.3–8.2)
PROT UR STRIP-MCNC: 100 MG/DL
RBC # BLD AUTO: 4.36 10^6/UL (ref 3.72–5.28)
SP GR UR STRIP: 1.03
TOTAL CELLS COUNTED % (AUTO): 100 %
UROBILINOGEN UR-MCNC: 4 MG/DL (ref ?–2)
WBC # BLD AUTO: 10.8 10^3/UL (ref 4–10.5)

## 2020-03-27 NOTE — ER DOCUMENT REPORT
ED General





- General


TRAVEL OUTSIDE OF THE [place] IN LAST 30 DAYS: No





- Related Data


Home Medications: pre- vit





<SHAHNAZ STANFORD - Last Filed: 20 02:53>





<NEAL LUIS - Last Filed: 20 09:27>





- General


Chief Complaint: Nausea/Vomiting


Stated Complaint: 13 WKS PREG,DEHYDRATION,VOMITTING


Time Seen by Provider: 20 23:59


Primary Care Provider: 


TIFFANIE GONZALES ARNP [Primary Care Provider] - Follow up as needed





- HPI


Notes: 





Patient is a 24-year-old G2, P0 at approximately 13 weeks gestation who presents

to the emergency department for evaluation of nausea and vomiting.  She states 

she has had nausea and vomiting pretty much since she found out she was 

pregnant.  She has had 5-7 episodes of nonbloody, nonbilious emesis in the last 

several hours.  She states she is still urinating.  Normal bowel movements.  She

denies any vaginal bleeding or discharge.  (SHAHNAZ STANFORD)





- Related Data


Allergies/Adverse Reactions: 


                                        





No Known Allergies Allergy (Verified 20 22:51)


   











Past Medical History





- General


Information source: Patient





- Social History


Smoking Status: Former Smoker


Family History: Reviewed & Not Pertinent


Patient has suicidal ideation: No


Patient has homicidal ideation: No


Renal/ Medical History: Reports: Hx Kidney Stones


Past Surgical History: Reports: Hx Kidney (Renal Surgery) - 2017 with episodes 

kidney stone





- Immunizations


Immunizations up to date: Yes


Hx Diphtheria, Pertussis, Tetanus Vaccination: Yes





<SHAHNAZ STANFORD - Last Filed: 20 02:53>





Review of Systems





- Review of Systems


Gastrointestinal: See HPI


Female Genitourinary: See HPI


-: Yes All other systems reviewed and negative





<SHAHNAZ STANFORD - Last Filed: 20 02:53>





Physical Exam





<SHAHNAZ STANFORD - Last Filed: 20 02:53>





- Vital signs


Vitals: 





                                        











Temp Pulse Resp BP Pulse Ox


 


 98.9 F   114 H  18   123/76   99 


 


 20 22:48  20 22:48  20 22:48  20 22:48  20 22:48














- Notes


Notes: 





Vital signs reviewed, please refer to chart. Head is normocephalic, atraumatic. 

Pupils equal round, reactive to light.  Neck is supple without meningismus.  

Heart is regular rate and rhythm.  Lungs are clear to auscultation bilaterally. 

Abdomen is soft, nontender, normoactive bowel sounds throughout.  Extremities 

without cyanosis, clubbing. Posterior calves are nontender.  Peripheral pulses 

are equal.  Skin is warm and dry.  Patient is awake, alert, neurological exam is

nonfocal. (SHAHNAZ STANFORD)





Course





- Laboratory


Result Diagrams: 


                                 20 00:12





                                 20 00:12





<SHAHNAZ STANFORD - Last Filed: 20 02:53>





- Laboratory


Result Diagrams: 


                                 20 00:12





                                 20 00:12





<NEAL LUIS - Last Filed: 20 09:27>





- Re-evaluation


Re-evalutation: 





20 00:18


Patient presents emergency department for evaluation.  She was tachycardic on 

arrival.  Laboratory investigations and IV fluids were ordered.  She was also 

ordered to have fetal heart tones.  I did discuss antiemetics with her, and the 

fact that no medicine is 100% safe during pregnancy.  She voiced understanding. 

Decision was made, after discussion, to proceed with Zofran.  Patient is stable 

at this time, we will continue to monitor.


20 02:53


Patient continues to feel stable.  She has had some mild increase in nausea 

again, so further Zofran is ordered.  Otherwise, patient is found to have a ni

trite positive UTI.  I reviewed her last urine culture, which showed Klebsiella.

 Decision was made to proceed with a dose of IV ceftriaxone, will send her home 

with a prescription for Keflex.  The importance of completely treating this 

urinary tract infection and pregnancy was expressed to the patient.  The risk of

adverse outcome on the pregnancy was explained as well and she voiced 

understanding.  I will send her home with a prescription for Keflex.  She is to 

follow-up closely with OB, return to the ER with worsening or new concerning 

symptoms of any sort. (SHAHNAZ STANFORD)





20 09:27


Pharmacy called stating that he did not receive the prescription for the ant

ibiotic.  Patient requested her prescription to be sent to the [facility] pharmacy on

, prescription was resent (NEAL LUIS)





- Vital Signs


Vital signs: 





                                        











Temp Pulse Resp BP Pulse Ox


 


 97.9 F   91   16   118/69   100 


 


 20 03:49  20 03:49  20 03:49  20 03:49  20 03:49














- Laboratory


Laboratory results interpreted by me: 





                                        











  20





  00:12 00:12 01:55


 


WBC  10.8 H  


 


Hgb  11.0 L  


 


Hct  33.8 L  


 


MCV  77 L  


 


MCH  25.2 L  


 


RDW  18.3 H  


 


Plt Count  587 H  


 


Absolute Neuts (auto)  8.4 H  


 


Sodium   134.0 L 


 


Carbon Dioxide   19 L 


 


Creatinine   0.50 L 


 


Direct Bilirubin   0.5 H 


 


Total Protein   8.9 H 


 


Urine Protein    100 H


 


Urine Ketones    80 H


 


Urine Nitrite    POSITIVE H


 


Urine Urobilinogen    4.0 H


 


Ur Leukocyte Esterase    MODERATE H














Discharge





<SHAHNAZ STANFORD - Last Filed: 20 02:53>





<NEAL LUIS - Last Filed: 20 09:27>





- Discharge


Clinical Impression: 


 Nausea and vomiting during pregnancy prior to 22 weeks gestation





Urinary tract infection


Qualifiers:


 Urinary tract infection type: site unspecified Hematuria presence: without 

hematuria Qualified Code(s): N39.0 - Urinary tract infection, site not specified





Condition: Stable


Disposition: HOME, SELF-CARE


Instructions:  Antinausea Medication ([facility]), Cephalexin ([facility]), Intravenous (IV) 

Fluids ([facility]), Urinary Tract Infection ([facility])


Additional Instructions: 


Rest, stay well hydrated with small, frequent sips of fluids.  Take all the 

antibiotic as prescribed until gone.  Follow-up with OB next week.  Return to 

the emergency department with worsening or new concerning symptoms of any sort.


Prescriptions: 


Cephalexin [Cephalexin 250 MG Tablet] 250 mg PO QID #20 tablet


Referrals: 


TIFFANIE GONZALES ARNP [Primary Care Provider] - Follow up as needed

## 2020-09-11 ENCOUNTER — HOSPITAL ENCOUNTER (OUTPATIENT)
Dept: HOSPITAL 62 - LC | Age: 25
Discharge: HOME | End: 2020-09-11
Attending: OBSTETRICS & GYNECOLOGY
Payer: MEDICAID

## 2020-09-11 DIAGNOSIS — O47.1: Primary | ICD-10-CM

## 2020-09-11 DIAGNOSIS — Z3A.38: ICD-10-CM

## 2020-09-11 LAB
APPEARANCE UR: (no result)
APTT PPP: YELLOW S
BARBITURATES UR QL SCN: NEGATIVE
BILIRUB UR QL STRIP: NEGATIVE
GLUCOSE UR STRIP-MCNC: NEGATIVE MG/DL
KETONES UR STRIP-MCNC: 20 MG/DL
METHADONE UR QL SCN: NEGATIVE
NITRITE UR QL STRIP: NEGATIVE
PCP UR QL SCN: NEGATIVE
PH UR STRIP: 7 [PH] (ref 5–9)
PROT UR STRIP-MCNC: NEGATIVE MG/DL
SP GR UR STRIP: 1.01
URINE AMPHETAMINES SCREEN: NEGATIVE
URINE BENZODIAZEPINES SCREEN: NEGATIVE
URINE COCAINE SCREEN: NEGATIVE
URINE MARIJUANA (THC) SCREEN: NEGATIVE
UROBILINOGEN UR-MCNC: NEGATIVE MG/DL (ref ?–2)

## 2020-09-11 PROCEDURE — 59025 FETAL NON-STRESS TEST: CPT

## 2020-09-11 PROCEDURE — 80307 DRUG TEST PRSMV CHEM ANLYZR: CPT

## 2020-09-11 PROCEDURE — 81005 URINALYSIS: CPT

## 2020-09-11 NOTE — NON STRESS TEST REPORT
=================================================================

Non Stress Test

=================================================================

Datetime Report Generated by CPN: 09/11/2020 04:26

   

   

=================================================================

DEMOGRAPHIC

=================================================================

   

EGA NST:  38.3

   

=================================================================

INDICATION

=================================================================

   

Indication for Study (NST) Other:  gestational age > 32 weeks

   

=================================================================

VITAL SIGNS

=================================================================

   

Temperature - NST:  97.8

Pulse - NST:  85

RESP - NST:  16

NBPSYS NST:  110

NBPDIA NST:  65

   

=================================================================

MONITORING

=================================================================

   

Monitor Explained:  Monitor Explained; Test Explained; Patient

   Verbalized Understanding

Time on Monitor:  09/11/2020 02:36

Time off Monitor:  09/11/2020 03:37

NST Duration:  61

   

=================================================================

NST INTERVENTIONS

=================================================================

   

NST Interventions:  PO Hydration; Reposition Patient

Physician Notified NST:  dr arzate

BABY A:  [id number]

   

=================================================================

BABY A

=================================================================

   

Fetal Movement :  Present

Contraction Frequency :  irritability

FHR Baseline :  140

Accelerations :  15X15

Decelerations :  None

Variability :  Moderate 6-25bpm

NST Review:  Meets Criteria for Reactive NST

NST Review and Verified By :  CANDICE Deal RN

NST Results:  Reactive

   

=================================================================

NST REPORT

=================================================================

   

Report Trigger:  Send Report

## 2020-09-19 ENCOUNTER — HOSPITAL ENCOUNTER (OUTPATIENT)
Dept: HOSPITAL 62 - LC | Age: 25
Discharge: HOME | End: 2020-09-19
Attending: OBSTETRICS & GYNECOLOGY
Payer: MEDICAID

## 2020-09-19 DIAGNOSIS — Z34.93: Primary | ICD-10-CM

## 2020-09-19 LAB
APPEARANCE UR: CLEAR
APTT PPP: (no result) S
BARBITURATES UR QL SCN: NEGATIVE
BILIRUB UR QL STRIP: NEGATIVE
GLUCOSE UR STRIP-MCNC: NEGATIVE MG/DL
KETONES UR STRIP-MCNC: NEGATIVE MG/DL
METHADONE UR QL SCN: NEGATIVE
NITRITE UR QL STRIP: NEGATIVE
PCP UR QL SCN: NEGATIVE
PH UR STRIP: 7 [PH] (ref 5–9)
PROT UR STRIP-MCNC: NEGATIVE MG/DL
SP GR UR STRIP: 1
URINE AMPHETAMINES SCREEN: NEGATIVE
URINE BENZODIAZEPINES SCREEN: NEGATIVE
URINE COCAINE SCREEN: NEGATIVE
URINE MARIJUANA (THC) SCREEN: NEGATIVE
UROBILINOGEN UR-MCNC: NEGATIVE MG/DL (ref ?–2)

## 2020-09-19 PROCEDURE — 59025 FETAL NON-STRESS TEST: CPT

## 2020-09-19 PROCEDURE — 80307 DRUG TEST PRSMV CHEM ANLYZR: CPT

## 2020-09-19 PROCEDURE — 81005 URINALYSIS: CPT

## 2020-09-19 NOTE — NON STRESS TEST REPORT
=================================================================

Non Stress Test

=================================================================

Datetime Report Generated by CPN: 09/19/2020 15:04

   

   

=================================================================

DEMOGRAPHIC

=================================================================

   

EGA NST:  39.4

   

=================================================================

INDICATION

=================================================================

   

Indication for Study (NST) Other:  LC- ctxs not in labor

   

=================================================================

VITAL SIGNS

=================================================================

   

Temperature - NST:  97.8

Pulse - NST:  77

NBPSYS NST:  113

NBPDIA NST:  61

   

=================================================================

MONITORING

=================================================================

   

Monitor Explained:  Monitor Explained; Test Explained; Patient

   Verbalized Understanding

Time on Monitor:  09/19/2020 12:10

Time off Monitor:  09/19/2020 14:47

NST Duration:  157

   

=================================================================

NST INTERVENTIONS

=================================================================

   

NST Interventions:  PO Hydration

Physician Notified NST:  Dr Fernandes

BABY A:  [id number]

   

=================================================================

BABY A

=================================================================

   

Fetal Movement :  Present

Contraction Frequency :  irregular

FHR Baseline :  145

Accelerations :  15X15

Decelerations :  None

Variability :  Moderate 6-25bpm

NST Review:  Meets Criteria for Reactive NST

NST Review and Verified By :  BARBARA Zhangin, RN

NST Results:  Reactive

   

=================================================================

NST COMMENTS

=================================================================

   

NST Comments:  MD on unit reviewing FHT strip 

   

=================================================================

NST REPORT

=================================================================

   

Report Trigger:  Send Report

## 2020-09-22 ENCOUNTER — HOSPITAL ENCOUNTER (OUTPATIENT)
Dept: HOSPITAL 62 - LC | Age: 25
Discharge: HOME | End: 2020-09-22
Attending: OBSTETRICS & GYNECOLOGY
Payer: MEDICAID

## 2020-09-22 DIAGNOSIS — Z3A.40: ICD-10-CM

## 2020-09-22 DIAGNOSIS — O47.1: Primary | ICD-10-CM

## 2020-09-22 LAB
APPEARANCE UR: (no result)
APTT PPP: YELLOW S
BARBITURATES UR QL SCN: NEGATIVE
BILIRUB UR QL STRIP: NEGATIVE
GLUCOSE UR STRIP-MCNC: NEGATIVE MG/DL
KETONES UR STRIP-MCNC: NEGATIVE MG/DL
METHADONE UR QL SCN: NEGATIVE
NITRITE UR QL STRIP: NEGATIVE
PCP UR QL SCN: NEGATIVE
PH UR STRIP: 7 [PH] (ref 5–9)
PROT UR STRIP-MCNC: NEGATIVE MG/DL
SP GR UR STRIP: 1.01
URINE AMPHETAMINES SCREEN: NEGATIVE
URINE BENZODIAZEPINES SCREEN: NEGATIVE
URINE COCAINE SCREEN: NEGATIVE
URINE MARIJUANA (THC) SCREEN: NEGATIVE
UROBILINOGEN UR-MCNC: NEGATIVE MG/DL (ref ?–2)

## 2020-09-22 PROCEDURE — 81005 URINALYSIS: CPT

## 2020-09-22 PROCEDURE — 59025 FETAL NON-STRESS TEST: CPT

## 2020-09-22 PROCEDURE — 80307 DRUG TEST PRSMV CHEM ANLYZR: CPT

## 2020-09-22 NOTE — NON STRESS TEST REPORT
=================================================================

Non Stress Test

=================================================================

Datetime Report Generated by CPN: 09/22/2020 10:22

   

   

=================================================================

DEMOGRAPHIC

=================================================================

   

Test Number:  3

EGA NST:  40.0

   

=================================================================

INDICATION

=================================================================

   

Indication for Study (NST) Other:  labor check

   

=================================================================

VITAL SIGNS

=================================================================

   

Temperature - NST:  98.4

Pulse - NST:  75

RESP - NST:  20

NBPSYS NST:  108

NBPDIA NST:  67

   

=================================================================

MONITORING

=================================================================

   

Monitor Explained:  Monitor Explained; Test Explained; Patient

   Verbalized Understanding

Time on Monitor:  09/22/2020 09:17

Time off Monitor:  09/22/2020 10:03

NST Duration:  46

   

=================================================================

NST INTERVENTIONS

=================================================================

   

NST Interventions:  PO Hydration; Reposition Patient

Physician Notified NST:  A Farah CNM

BABY A:  [id number]

   

=================================================================

BABY A

=================================================================

   

Fetal Movement :  Present

Contraction Frequency :  irreg

FHR Baseline :  145

Accelerations :  15X15

Decelerations :  None

Variability :  Moderate 6-25bpm

NST Review:  Meets Criteria for Reactive NST

NST Review and Verified By :  ISABELLE Patino Results:  Reactive

   

=================================================================

NST REPORT

=================================================================

   

Report Trigger:  Send Report

## 2020-09-24 ENCOUNTER — HOSPITAL ENCOUNTER (INPATIENT)
Dept: HOSPITAL 62 - LC | Age: 25
LOS: 2 days | Discharge: HOME | End: 2020-09-26
Attending: OBSTETRICS & GYNECOLOGY | Admitting: OBSTETRICS & GYNECOLOGY
Payer: MEDICAID

## 2020-09-24 DIAGNOSIS — Z3A.40: ICD-10-CM

## 2020-09-24 DIAGNOSIS — Z87.891: ICD-10-CM

## 2020-09-24 LAB
ADD MANUAL DIFF: (no result)
ANISOCYTOSIS BLD QL SMEAR: (no result)
APPEARANCE UR: (no result)
APTT PPP: YELLOW S
BASOPHILS # BLD AUTO: 0 10^3/UL (ref 0–0.2)
BASOPHILS NFR BLD AUTO: 0.2 % (ref 0–2)
BILIRUB UR QL STRIP: NEGATIVE
DACRYOCYTES BLD QL SMEAR: SLIGHT
EOSINOPHIL # BLD AUTO: 0 10^3/UL (ref 0–0.6)
EOSINOPHIL NFR BLD AUTO: 0 % (ref 0–6)
ERYTHROCYTE [DISTWIDTH] IN BLOOD BY AUTOMATED COUNT: 24.3 % (ref 11.5–14)
GLUCOSE UR STRIP-MCNC: NEGATIVE MG/DL
HCT VFR BLD CALC: 28.4 % (ref 36–47)
HGB BLD-MCNC: 8.8 G/DL (ref 12–15.5)
KETONES UR STRIP-MCNC: 20 MG/DL
LYMPHOCYTES # BLD AUTO: 1.8 10^3/UL (ref 0.5–4.7)
LYMPHOCYTES NFR BLD AUTO: 12.3 % (ref 13–45)
MCH RBC QN AUTO: 21.8 PG (ref 27–33.4)
MCHC RBC AUTO-ENTMCNC: 31 G/DL (ref 32–36)
MCV RBC AUTO: 70 FL (ref 80–97)
MONOCYTES # BLD AUTO: 0.9 10^3/UL (ref 0.1–1.4)
MONOCYTES NFR BLD AUTO: 6.4 % (ref 3–13)
NEUTROPHILS # BLD AUTO: 12.1 10^3/UL (ref 1.7–8.2)
NEUTS SEG NFR BLD AUTO: 81.1 % (ref 42–78)
NITRITE UR QL STRIP: NEGATIVE
OVALOCYTES BLD QL SMEAR: (no result)
PH UR STRIP: 7 [PH] (ref 5–9)
PLATELET # BLD: 501 10^3/UL (ref 150–450)
PLATELET COMMENT: (no result)
POIKILOCYTOSIS BLD QL SMEAR: (no result)
POLYCHROMASIA BLD QL SMEAR: (no result)
PROT UR STRIP-MCNC: NEGATIVE MG/DL
RBC # BLD AUTO: 4.05 10^6/UL (ref 3.72–5.28)
SP GR UR STRIP: 1.02
SPHEROCYTES BLD QL SMEAR: (no result)
TARGETS BLD QL SMEAR: SLIGHT
TOTAL CELLS COUNTED % (AUTO): 100 %
UROBILINOGEN UR-MCNC: 4 MG/DL (ref ?–2)
WBC # BLD AUTO: 14.9 10^3/UL (ref 4–10.5)

## 2020-09-24 PROCEDURE — 86900 BLOOD TYPING SEROLOGIC ABO: CPT

## 2020-09-24 PROCEDURE — 86901 BLOOD TYPING SEROLOGIC RH(D): CPT

## 2020-09-24 PROCEDURE — 85025 COMPLETE CBC W/AUTO DIFF WBC: CPT

## 2020-09-24 PROCEDURE — 86592 SYPHILIS TEST NON-TREP QUAL: CPT

## 2020-09-24 PROCEDURE — 36415 COLL VENOUS BLD VENIPUNCTURE: CPT

## 2020-09-24 PROCEDURE — 85027 COMPLETE CBC AUTOMATED: CPT

## 2020-09-24 PROCEDURE — 0HQ9XZZ REPAIR PERINEUM SKIN, EXTERNAL APPROACH: ICD-10-PCS | Performed by: OBSTETRICS & GYNECOLOGY

## 2020-09-24 PROCEDURE — 84112 EVAL AMNIOTIC FLUID PROTEIN: CPT

## 2020-09-24 PROCEDURE — 81005 URINALYSIS: CPT

## 2020-09-24 PROCEDURE — 86850 RBC ANTIBODY SCREEN: CPT

## 2020-09-24 RX ADMIN — PENICILLIN G POTASSIUM SCH MLS/HR: 5000000 POWDER, FOR SOLUTION INTRAMUSCULAR; INTRAPLEURAL; INTRATHECAL; INTRAVENOUS at 15:37

## 2020-09-24 RX ADMIN — IBUPROFEN SCH: 800 TABLET, FILM COATED ORAL at 22:21

## 2020-09-24 RX ADMIN — FAMOTIDINE SCH MG: 20 TABLET, FILM COATED ORAL at 22:20

## 2020-09-24 RX ADMIN — IBUPROFEN SCH MG: 800 TABLET, FILM COATED ORAL at 22:20

## 2020-09-24 NOTE — DELIVERY SUMMARY
=================================================================

Del Sum A-C

=================================================================

Datetime Report Generated by CPN: 2020 20:36

   

   

=================================================================

DELIVERY PERSONNEL

=================================================================

   

DELIVERY PERSONNEL:  [id number]

Delivery Doctor::  Halie Fernandes MD

CRNA::  Babar George CRNA

Labor and Delivery Nurse::  Basia Camp RN

Labor and Delivery Nurse::  Alysia Whalen RN

Scrub Tech/CNA:  Nancy Mast, ST

   

=================================================================

MATERNAL INFORMATION

=================================================================

   

Delivery Anesthesia:  Epidural

Medications After Delivery:  Pitocin 30 Units in 500ml NS/D5W

Delivery QBL:  50

Maternal Complications:  None

Provider Comments:  Called to patients room as she was complete and +2

   station. Patient pushing and contractions spaced out to every 2-4

   minutes. Pitocin low dose started and with maternal pushing,

   delivered viable female infant in OP presentation. Nuchal x1 noted

   to be loose and delivered through. INfant vigoruous at delivery.

   Cord clamping delayed for 30 seconds. After cord doubly clamped and

   cut, infant was placed on maternal chest. Both mother and infant

   stable. Fundus firm. FIrst degree (small ) repaired in running

   fashion.

   

=================================================================

LABOR SUMMARY

=================================================================

   

EDC:  2020 00:00

No. Babies in Womb:  1

 Attempted:  No

Labor Anesthesia:  Epidural

   

=================================================================

LABOR INFORMATION

=================================================================

   

Reason for Induction:  Not Applicable

Onset of Labor:  2020 07:52

Complete Dilatation:  2020 16:54

Oxytocin:  N/A

Group B Beta Strep:  positive

Antibiotics # of Doses:  2

Antibiotics Time of Last Dose:  2020 15:37

Name of Antibiotic Given:  Penicillin G

Steroids Given:  None

Reason Steroids Not Administered:  Not Applicable

   

=================================================================

MEMBRANES

=================================================================

   

Membranes Rupture Method:  Spontaneous

Rupture of Membranes:  2020 14:30

Length of Rupture (hr):  4.82

Amniotic Fluid Color:  Clear

Amniotic Fluid Amount:  Small

Amniotic Fluid Odor:  Normal

   

=================================================================

STAGES OF LABOR

=================================================================

   

Stage 1 hr:  9

Stage 1 min:  2

Stage 2 hr:  2

Stage 2 min:  25

Stage 3 hr:  0

Stage 3 min:  4

Total Time in Labor hr:  11

Total Time in Labor min:  31

   

=================================================================

VAGINAL DELIVERY

=================================================================

   

Episiotomy:  None

Laceration #1:  Perineal

Laceration Extension #1:  First Degree

Laceration Repair:  Yes

Laceration Repair Note:  Repaired with 3-0 chromic in a running fashion

Sponge Count Correct:  Yes

Sharps Count Correct:  Yes

   

=================================================================

CSECTION DELIVERY

=================================================================

   

Primary Indication:  N/A

Secondary Indication:  N/A

CSection Incidence:  N/A

Labor:  N/A

Elective:  N/A

CSection Incision:  N/A

   

=================================================================

BABY A INFORMATION

=================================================================

   

Infant Delivery Date/Time:  2020 19:19

Method of Delivery:  Vaginal

Nurse Controlled Delivery:  No

Born in Route :  No

:  N/A

Forceps:  N/A

Vacuum Extraction:  N/A

Shoulder Dystocia :  No

   

=================================================================

PRESENTATION/POSITION BABY A

=================================================================

   

Presentation:  Cephalic

Cephalic Presentation:  Vertex

Vertex Position:  direct occipital posterior 

Breech Presentation:  N/A

   

=================================================================

PLACENTA INFORMATION BABY A

=================================================================

   

Placenta Delivery Time :  2020 19:23

Placenta Method of Delivery:  Spontaneous

Placenta Status:  Delivered

   

=================================================================

APGAR SCORES BABY A

=================================================================

   

Heart Rate 1 min:  >100 bpm

Resp Effort 1 min:  Good Cry

Reflex Irritability 1 min:  Cough or Sneeze or Pulls Away

Muscle Tone 1 min:  Active Motion

Color 1 min:  Blue/Pale

Resuscitation Effort 1 min:  Tactile Stimulation

APGAR SCORE 1 MIN:  8

Heart Rate 5 min:  >100 bpm

Resp Effort 5 min:  Good Cry

Reflex Irritability 5 min:  Cough or Sneeze or Pulls Away

Muscle Tone 5 min:  Active Motion

Color 5 min:  Body Pink, Extremities Blue

Resuscitation Effort 5 min:  Tactile Stimulation

APGAR SCORE 5 MIN:  9

   

=================================================================

INFANT INFORMATION BABY A

=================================================================

   

Gestational Age at Delivery:  40.2

Gestational Status:  Full Term- 39- 40.6 Weeks

Infant Outcome :  Liveborn

Infant Condition :  Stable

Infant Sex:  Female

   

=================================================================

IDENTIFICATION BABY A

=================================================================

   

Infant Verification Date/Time:  2020 20:30

ID Band Number:  [id number]

Mother's Name Verified:  Yes

Infant Medical Record Number:  [id number]

RN Verifying Infant:  CANDICE Deal, RN/ MIRNA Hale

   

=================================================================

WEIGHT/LENGTH BABY A

=================================================================

   

Infant Birthweight (gm):  3019

Infant Weight (lb):  6

Infant Weight (oz):  10

Infant Length (in):  19.00

Infant Length (cm):  48.26

   

=================================================================

CORD INFORMATION BABY A

=================================================================

   

No. Cord Vessels:  3

Nuchal Cord :  Around Neck x1, Loose

Cord Blood Taken:  Yes-For Eval (Mom's Blood Type - or O+)

Infant Suction:  None

   

=================================================================

ASSESSMENT BABY A

=================================================================

   

Physical Findings at Delivery:  Within Normal Limits

Infant Respirations:  Appears Normal

Skin to Skin:  Yes

Skin to Skin Time (min):  60

Neonatologist/ALS Called :  No

Transferred To:  Remains with Mother

   

=================================================================

BABY B INFORMATION

=================================================================

   

 :  N/A

   

=================================================================

SIGNATURES

=================================================================

   

Signature:  Electronically signed by Halie Fernandes MD on 2020 at

   19:42  with User ID: [email or web address]

:  Electronically signed by Halie Fernandes MD on 2020 at 19:42 

   with User ID: [email or web address]

:  I personally evaluated and examined the patient in conjunction with

   the MLP and agree with the assessment, treatment plan and

   disposition.

## 2020-09-24 NOTE — L&D PROGRESS NOTES
=================================================================

PROGRESS NOTES

=================================================================

Datetime Report Generated by CPN: 2020 15:53

   

   

=================================================================

PROGRESS NOTE

=================================================================

   

Impression:  Normal Progression of Labor

Procedures:  Sterile Vag Exam

Plan:  Continue Present Management

Vital Signs :  Reviewed

Comment:  SVE 8/100/0.  Membranes appear to be ruptured w clear fluid

   noted on pad.  Scalp stimulation noted.  Anticipate .  Epidural

   in place-very comfortable.

   

=================================================================

VAGINAL EXAM

=================================================================

   

Dilatation:  8

Effacement:  100

Station:  0

   

=================================================================

LAST VAGINAL EXAM-NURSING

=================================================================

   

Nursing Exam Dilitation:  8.0

Nursing Exam Effacement:  100

Nursing Exam Station:  0

Nursing Exam Contractions:  applied

   

=================================================================

MEMBRANES

=================================================================

   

Membranes:  Ruptured

Amniotic Fluid Color:  Clear

   

=================================================================

FETUS A

=================================================================

   

Monitoring:  External US

:  40.2

Fetal Presentation:  Vertex

   

=================================================================

SIGNATURE

=================================================================

   

SIGNATURE:  [id number];[id number];[id number]

Assignment:  Halie Fernandes MD

Signature:  Electronically signed by Keren Del Rosario CNM on 2020 at

   15:52  with User ID: [email or web address]

:  Electronically signed by Keren Del Rosario CNM on 2020 at 15:52 

   with User ID: [email or web address]

:  I personally evaluated and examined the patient in conjunction with

   the MLP and agree with the assessment, treatment plan and

   disposition.

## 2020-09-24 NOTE — BIRTH CERTIFICATE DATA
=================================================================

Birth Cert Data

=================================================================

Datetime Report Generated by [facility]: 2020 20:35

   

   

=================================================================

BIRTH CERTIFICATE DATA

=================================================================

   

 47a. Prenatal Care:  Yes    (2020 02:15:Mable Fernando RN)

 47b. Date of First Visit:  2020 00:00    (2020 02:15:April

   ISABELLE Camp)

 47c. Date of Last Visit:  2020 00:00    (2020 02:15:April

   ISABELLE Camp)

 47d. Number of Prenatal Visits:  11    (2020 02:15:April

   ISABELLE Camp)

 48a. Number of Prev Live Births:  0    (2020 02:15:Rani Posadas RN)

 48b. Now Livin    (2020 02:15:Rani Posadas RN)

 48c. Live Births Now Dead:  0    (2020 02:15:QS system process)

 48e. Pregnancy Losses:  1    (2020 02:15:Rani Posadas RN)

 48f. Date of Last Preg Loss:  10/01/2017 00:00    (2020

   02:15:Rani Posadas RN)

   

=================================================================

RISK FACTORS IN THIS PREGNANCY

=================================================================

   

 49a. Diabetes:  No    (2020 02:15:Mable Fernando RN)

 49b. Hypertension:  Yes    (2020 02:15:Mable Fernando RN)

 Type of Hypertension:  Chronic    (2020 02:15:Mable Fernando RN)

 49c. Previous  Births:  0    (2020 02:15:Rani Posadas RN)

 49d. Stillborns:  No    (2020 02:15:Mable Fernando RN)

 49d. IUGR:  No    (2020 02:15:Mable Fernando RN)

 49e. Infertility Treatment:  No    (2020 02:15:Mable Fernando RN)

 49f. Previous Cesareans:  0    (2020 02:15:Rani Posadas RN)

   

=================================================================

Mother's Height

=================================================================

   

 50b. Height Inches:  59    (2020 07:41:QS system process)

   

=================================================================

Mother's Weight 

=================================================================

   

 51b. Weight at Delivery (lbs):  191    (2020 07:41:QS system

   process)

 52. Dt Last Normal Menses Began:  2019 00:00    (2020

   02:15:April ISABELLE Camp)

   

=================================================================

Infections Present/Treated

=================================================================

   

 53a. Gonorrhea:  No    (2020 02:15:Mable Fernando RN)

 Results this Hospital Visit :  Negative    (2020 02:15:Rani Posadas RN)

 53b. Syphilis:  No    (2020 02:15:Mable Fernando RN)

 53c. Chlamydia:  No    (2020 02:15:Mable Fernando RN)

 Results this Hospital Visit:  Negative    (2020 02:15:Rani Posadas RN)

 53d. Hepatitis B:  No    (2020 02:15:Mable Fernando RN)

 Results this Hospital Visit:  Negative    (2020 02:15:Mable Fernando RN)

 53e. Hepatitis C:  Negative    (2020 02:15:Rani Posadas RN)

 53h. Mother Tested for HBsAG:  Yes    (2020 02:15:Rani Posadas RN)

 53i. Date Tested:  2020 00:00    (2020 02:15:Rani Posadas RN)

 53j. Test Result:  Negative    (2020 02:15:Mable Fernando RN)

   

=================================================================

Obstetric Procedures 

=================================================================

   

 54a, b, c. Obstetric Procedures:  Ultrasound    (2020

   02:15:Mable Fernando RN)

   

=================================================================

Cigarette Smoking 

=================================================================

   

 Cigarette Smoking:  Never Smoker. [id number]    (2020

   02:15:Mable Fernando RN)

 55a. 3 Months Before Preg - Ci    (2020 02:15:Rani Posadas RN)

 55b. 1st Trimester of Preg- Ci    (2020 02:15:Rani Posadas RN)

 55c. 2nd Trimester of Preg- Ci    (2020 02:15:Rani Posadas RN)

 55d. 3rd Trimester of Preg- Ci    (2020 02:15:Rani Posadas RN)

   

=================================================================

Onset of Labor

=================================================================

   

 56a. PROM >12 Hrs:  4.82    (2020 02:15:QS system process)

 56b. Precipitous Labor <3 Hrs:  11    (2020 02:15:QS system

   process)

 56c. Prolonged Labor > 20 Hrs:  11    (2020 02:15:QS system

   process)

   

=================================================================



=================================================================

   

 57a. Induction of Labor:  N/A    (2020 02:15:Basia Camp RN)

 57c. Non-Vertex Presentation A:  Vertex    (2020 02:15:Marissa Deal RN)

 57d. Steroids - Fetal Lung Mat:  None    (2020 02:15:Basia Camp RN)

 57d. Steroids - Fetal Lung Mat:  Not Applicable    (2020

   02:15:Basia Camp RN)

 57e. Antibiotics During Labor:  2020 15:37    (2020

   02:15:Basia Camp RN)

 57f. Mat Chorio or Temp >100.4:  98.3    (2020 02:15:Basia Camp RN)

 57g. Moderate/Heavy Meconium:  Clear    (2020 15:46:Basia Camp RN)

 57h. Fetal Intolerance of Labor:  N/A    (2020 02:15:Alysia Whalen RN)

:  N/A    (2020 02:15:Alysia Whalen RN)

 57i. Epidural/Spinal Anesthesia:  Epidural    (2020 02:15:Basia Camp RN)

   

=================================================================

Method of Delivery

=================================================================

   

 58a. Forceps - Unsuccessful A:  N/A    (2020 02:15:Marissa Deal RN)

 58b. Vacuum - Unsuccessful A:  N/A    (2020 02:15:Marissa Deal RN)

   

=================================================================

58c. Presentation at Birth

=================================================================

   

 58c. Presentation at Birth - A :  Vertex    (2020 02:15:Marissa Deal RN)

 58c. Presentation at Birth - A :  N/A    (2020 02:15:Marissa Deal RN)

 58c. Presentation at Birth - A :  Cephalic    (2020 07:52:Rani Posadas RN)

   

=================================================================

Final Route and Method of Del 

=================================================================

   

 58d. Baby A Route/Delivery:  Vaginal    (2020 19:19:Alysia Whalen RN)

 58e. Trial of Labor Attempted:  No    (2020 02:15:Basia Camp RN)

 58e. Trial of Labor Attempted A:  N/A    (2020 02:15:Basia Camp RN)

 58e. Trial of Labor Attempted B:  N/A    (2020 02:15:Basia Camp RN)

   

=================================================================

Maternal Morbidity

=================================================================

   

 59b. 3rd or 4th Degree Lacs:  Perineal    (2020 02:15:Halie Fernandes MD)

 59b. 3rd or 4th Degree Lacs:  First Degree    (2020

   02:15:Alysia Whalen RN)

   

=================================================================



=================================================================

   

 Birthweight Baby A:  3019    (2020 02:15:Alysia Whalen RN)

 60a. Pounds :  6    (2020 02:15:QS system process)

 60b. Ounces:  10    (2020 02:15:QS system process)

   

=================================================================

61. GA at Delivery 

=================================================================

   

 Baby A:  40.2    (2020 02:15:Basia Camp RN)

:  Full Term- 39- 40.6 Weeks    (2020 02:15:QS system process)

   

=================================================================

62a. APGAR 5 Minute

=================================================================

   

 Baby A:  9    (2020 02:15:QS system process)

## 2020-09-24 NOTE — WARNING SIGNS IN BABIES
=================================================================

VOD Warning Signs

=================================================================

Datetime Report Generated by [facility]: 09/24/2020 20:58

   

VOD#608 -Warning Signs in Babies:  Viewed with Parent(s)/Family   

   (09/24/2020 20:45:Alysia Whalen RN)

## 2020-09-24 NOTE — ADMISSION PHYSICAL
=================================================================



=================================================================

Datetime Report Generated by CPN: 2020 10:51

   

   

=================================================================

CURRENT ADMISSION

=================================================================

   

Chief Complaint:  Uterine Contractions

Indication for Induction:  Not Applicable

Admit Impression :  Term, Intrauterine Pregnancy; Active Labor

Admit Impression- Other:  5cm after ambulation

Admit Plan:  Admit to Unit; Initiate Labor Protocol

   

=================================================================

ALLERGIES

=================================================================

   

Medication Allergies:  No

Medication Allergies:  No Known Allergies (2020)

Latex:  No Latex Allergies

   

=================================================================

OBSTETRICAL HISTORY

=================================================================

   

EDC:  2020 00:00

:  2

Para:  0

Term:  0

:  0

SAB:  0

IAB:  1

Ectopic:  0

Livin

Cesareans:  0

VBACs:  0

Multiple Births:  0

Gestational Diabetes:  No

Rh Sensitization:  No

Incompetent Cervix:  No

MAYDA:  No

Infertility:  No

ART Treatment:  No

Uterine Anomaly:  No

IUGR:  No

Hx Previous C/S:  No

Macrosomia:  No

Hx Loss/Stillborn:  No

PIH:  No

Hx  Death:  No

Placenta Previa/Abruption:  No

Depression/PP Depression:  No

PTL/PROM:  No

Post Partum Hemorrhage:  No

Current Pregnancy Procedures:  Ultrasound

Obstetrical History Comments:  2017 SAB

   G2- current 

   

=================================================================

***SEE PRENATAL RECORDS***

=================================================================

   

Alcohol:  No

Marijuana :  No

Cocaine:  No

Other Illicit Drugs:  No

Cigarettes:  Never Smoker. [id number]

   

=================================================================

MEDICAL HISTORY

=================================================================

   

Diabetes:  No

Blood Transfusion:  No

Pulmonary Disease (Asthma, TB):  No

Breast Disease:  No

Hypertension:  Yes

Gyn Surgery:  No

Heart Disease:  No

Hosp/Surgery:  Yes

Autoimmune Disorder:  No

Anesthetic Complications:  No

Kidney Disease:  No

Abnormal Pap Smear:  No

Neuro/Epilepsy:  No

Psychiatric Disorders:  No

Other Medical Diseases:  No

Hepatitis/Liver Disease:  No

Significant Family History:  No

Varicosities/Phlebitis:  No

Trauma/Violence :  No

Thyroid Dysfunction:  No

Medical History Comments:  high bloodp pressure prior to pregnancy,

   kidney stone 

   

=================================================================

INFECTIOUS HISTORY

=================================================================

   

Gonorrhea:  No

Genital Herpes:  No

Chlamydia:  No

Tuberculosis:  No

Syphilis:  No

Hepatitis:  No

HIV/AIDS Exposure:  No

Rash or Viral Illness:  No

HPV:  No

   

=================================================================

PHYSICAL EXAM

=================================================================

   

General:  Normal

HEENT:  Normal

Neurologic:  Normal

Thyroid:  Deferred

Heart:  Normal

Lungs:  Normal

Breast:  Deferred

Back:  Normal

Abdomen:  Normal

Genitourinary Exam:  Deferred

Extremities:  Normal

DTRs:  Normal

Pelvic Type:  Adequate

Vital Signs:  Reviewed

   

=================================================================

VAGINAL EXAM

=================================================================

   

Dilatation:  5

Effacement:  100

Station:  -1

   

=================================================================

MEMBRANES

=================================================================

   

Membranes:  Intact

   

=================================================================

FETUS A

=================================================================

   

EGA:  40.2

Monitoring:  External US

FHR- Baseline:  130

Variability:  Moderate 6-25bpm

Accelerations:  15X15

Decelerations:  None

Fetal Presentation:  Vertex

Admit Comment:  

   Admitted in labor at 5cm after ambulation

   GBS positive-antibx in labor

   Hx pica

   Hx smoking and marijuana use -reports none since pregnancy confirmed

   Anticipate 

   

=================================================================

PLANS FOR LABOR AND DELIVERY

=================================================================

   

Labor and Delivery:  None

Pain Management:  Epidural

Feeding Preference:  Breast

Benefit of Breast Feed Discussed:  Yes

Circumcision:  N/A

   

=================================================================

INFORMED CONSENT

=================================================================

   

Assignment:  Halie Fernandes MD

Signature:  Electronically signed by Keren Del Rosario CNM on 2020 at

   10:51  with User ID: [email or web address]

:  Electronically signed by Keren Del Rosario CNM on 2020 at 10:51 

   with User ID: [email or web address]

:  I personally evaluated and examined the patient in conjunction with

   the MLP and agree with the assessment, treatment plan and

   disposition.

## 2020-09-24 NOTE — WARNING SIGNS IN BABIES
=================================================================

VOD Warning Signs

=================================================================

Datetime Report Generated by [facility]: 09/24/2020 21:56

   

VOD#608 -Warning Signs in Babies:  Viewed with Parent(s)/Family   

   (09/24/2020 21:30:Alysia Whalen RN)

## 2020-09-25 LAB
ERYTHROCYTE [DISTWIDTH] IN BLOOD BY AUTOMATED COUNT: 24.4 % (ref 11.5–14)
HCT VFR BLD CALC: 29.5 % (ref 36–47)
HGB BLD-MCNC: 9.1 G/DL (ref 12–15.5)
MCH RBC QN AUTO: 21.8 PG (ref 27–33.4)
MCHC RBC AUTO-ENTMCNC: 30.9 G/DL (ref 32–36)
MCV RBC AUTO: 71 FL (ref 80–97)
PLATELET # BLD: 492 10^3/UL (ref 150–450)
RBC # BLD AUTO: 4.17 10^6/UL (ref 3.72–5.28)
WBC # BLD AUTO: 20.8 10^3/UL (ref 4–10.5)

## 2020-09-25 RX ADMIN — Medication SCH CAP: at 10:48

## 2020-09-25 RX ADMIN — FAMOTIDINE SCH MG: 20 TABLET, FILM COATED ORAL at 10:48

## 2020-09-25 RX ADMIN — IBUPROFEN SCH MG: 800 TABLET, FILM COATED ORAL at 21:32

## 2020-09-25 RX ADMIN — DOCUSATE SODIUM SCH MG: 100 CAPSULE, LIQUID FILLED ORAL at 10:48

## 2020-09-25 RX ADMIN — DOCUSATE SODIUM SCH MG: 100 CAPSULE, LIQUID FILLED ORAL at 18:17

## 2020-09-25 RX ADMIN — IBUPROFEN SCH MG: 800 TABLET, FILM COATED ORAL at 06:02

## 2020-09-25 RX ADMIN — SENNOSIDES, DOCUSATE SODIUM SCH EACH: 50; 8.6 TABLET, FILM COATED ORAL at 10:48

## 2020-09-25 RX ADMIN — FERROUS SULFATE TAB 325 MG (65 MG ELEMENTAL FE) SCH MG: 325 (65 FE) TAB at 10:48

## 2020-09-25 RX ADMIN — FAMOTIDINE SCH MG: 20 TABLET, FILM COATED ORAL at 21:32

## 2020-09-25 RX ADMIN — IBUPROFEN SCH MG: 800 TABLET, FILM COATED ORAL at 15:32

## 2020-09-25 RX ADMIN — FERROUS SULFATE TAB 325 MG (65 MG ELEMENTAL FE) SCH MG: 325 (65 FE) TAB at 18:17

## 2020-09-25 NOTE — PDOC PROGRESS REPORT
Subjective-OB


Progress Note for:: 09/25/20


Subjective: 





reports bleeding slowing, pain controlled with current meds. denies needs.





Physical Exam (OB)


Vital Signs: 


                                        











Temp Pulse Resp BP Pulse Ox


 


 97.4 F   87   20   132/85 H  98 


 


 09/25/20 12:15  09/25/20 12:15  09/25/20 12:15  09/25/20 12:15  09/25/20 12:15








                                 Intake & Output











 09/24/20 09/25/20 09/26/20





 06:59 06:59 06:59


 


Intake Total  590 200


 


Output Total  600 


 


Balance  -10 200


 


Weight  87.4 kg 














- Maternal Morbidity


59. Maternal Morbidity (serious complications experinced by the mother 

associated with labor and delivery: None of the above





- Abdomen


Description: Soft


Fundal Description: Firm, Midline


Fundal Height: u/u - u/2





- Abdominal


Distension: No distension


Tenderness: Nontender





- Extremities


Lower extremities: Jaycee's sign - neg


Calf: Normal, Nontender





Objective-Diagnostic


Laboratory: 


                                        





                                 09/25/20 07:43 





                                        











  09/25/20





  07:43


 


WBC  20.8 H


 


RBC  4.17


 


Hgb  9.1 L


 


Hct  29.5 L


 


MCV  71 L


 


MCH  21.8 L


 


MCHC  30.9 L


 


RDW  24.4 H


 


Plt Count  492 H














Assessment and Plan(PN)





- Assessment and Plan


(1) Normal vaginal delivery


Is this a current diagnosis for this admission?: Yes   





- Time Spent with Patient


Time with patient: Less than 15 minutes


Medications reviewed and adjusted accordingly: Yes





- Disposition


Anticipated Discharge Disposition: Home, Self Care


Anticipated Discharge Timeframe: within 24 hours

## 2020-09-26 VITALS — SYSTOLIC BLOOD PRESSURE: 126 MMHG | DIASTOLIC BLOOD PRESSURE: 82 MMHG

## 2020-09-26 LAB
%HYPO/RBC NFR BLD AUTO: (no result) %
ADD MANUAL DIFF: (no result)
ANISOCYTOSIS BLD QL SMEAR: (no result)
BASOPHILS # BLD AUTO: 0 10^3/UL (ref 0–0.2)
BASOPHILS NFR BLD AUTO: 0.2 % (ref 0–2)
DACRYOCYTES BLD QL SMEAR: SLIGHT
EOSINOPHIL # BLD AUTO: 0.1 10^3/UL (ref 0–0.6)
EOSINOPHIL NFR BLD AUTO: 1.2 % (ref 0–6)
ERYTHROCYTE [DISTWIDTH] IN BLOOD BY AUTOMATED COUNT: 31.7 % (ref 11.5–14)
HCT VFR BLD CALC: 30.1 % (ref 36–47)
HGB BLD-MCNC: 9.2 G/DL (ref 12–15.5)
LYMPHOCYTES # BLD AUTO: 2.4 10^3/UL (ref 0.5–4.7)
LYMPHOCYTES NFR BLD AUTO: 20.2 % (ref 13–45)
MCH RBC QN AUTO: 22.1 PG (ref 27–33.4)
MCHC RBC AUTO-ENTMCNC: 30.4 G/DL (ref 32–36)
MCV RBC AUTO: 73 FL (ref 80–97)
MONOCYTES # BLD AUTO: 0.5 10^3/UL (ref 0.1–1.4)
MONOCYTES NFR BLD AUTO: 4.5 % (ref 3–13)
NEUTROPHILS # BLD AUTO: 8.7 10^3/UL (ref 1.7–8.2)
NEUTS SEG NFR BLD AUTO: 73.9 % (ref 42–78)
OVALOCYTES BLD QL SMEAR: (no result)
PLATELET # BLD: 494 10^3/UL (ref 150–450)
PLATELET COMMENT: ADEQUATE
POLYCHROMASIA BLD QL SMEAR: SLIGHT
RBC # BLD AUTO: 4.15 10^6/UL (ref 3.72–5.28)
TOTAL CELLS COUNTED % (AUTO): 100 %
WBC # BLD AUTO: 11.7 10^3/UL (ref 4–10.5)

## 2020-09-26 RX ADMIN — SENNOSIDES, DOCUSATE SODIUM SCH EACH: 50; 8.6 TABLET, FILM COATED ORAL at 10:29

## 2020-09-26 RX ADMIN — PENICILLIN G POTASSIUM SCH: 5000000 POWDER, FOR SOLUTION INTRAMUSCULAR; INTRAPLEURAL; INTRATHECAL; INTRAVENOUS at 02:49

## 2020-09-26 RX ADMIN — DOCUSATE SODIUM SCH MG: 100 CAPSULE, LIQUID FILLED ORAL at 10:28

## 2020-09-26 RX ADMIN — FERROUS SULFATE TAB 325 MG (65 MG ELEMENTAL FE) SCH MG: 325 (65 FE) TAB at 10:29

## 2020-09-26 RX ADMIN — FAMOTIDINE SCH MG: 20 TABLET, FILM COATED ORAL at 10:28

## 2020-09-26 RX ADMIN — Medication SCH CAP: at 10:29

## 2020-09-26 RX ADMIN — IBUPROFEN SCH MG: 800 TABLET, FILM COATED ORAL at 05:22

## 2020-09-26 NOTE — PDOC DISCHARGE SUMMARY
Impression





- Admit/DC Date/PCP


Admission Date/Primary Care Provider: 


  09/24/20 10:15





  





Discharge Date: 09/26/20





- Discharge Diagnosis


(1) Normal vaginal delivery


Is this a current diagnosis for this admission?: Yes   





- Additional Information


Discharge Diet: Regular


Discharge Activity: Balance Activity w/Rest, Pelvic Rest


Prescriptions: 


Ibuprofen [Motrin 800 mg Tablet] 800 mg PO Q8HP PRN #60 tablet


 PRN Reason: 


Home Medications: 








Pnv No.103/Folic/Om3s/Fish Oil [Prenatal Gummies] 1 each PO DAILY 03/10/20 


Ibuprofen [Motrin 800 mg Tablet] 800 mg PO Q8HP PRN #60 tablet 09/26/20 











Hospital Course


59. Maternal Morbidity (serious complications experinced by the mother 

associated with labor and delivery: None of the above





Results


Laboratory Results: 


                                        











WBC  20.8 10^3/uL (4.0-10.5)  H  09/25/20  07:43    


 


RBC  4.17 10^6/uL (3.72-5.28)   09/25/20  07:43    


 


Hgb  9.1 g/dL (12.0-15.5)  L  09/25/20  07:43    


 


Hct  29.5 % (36.0-47.0)  L  09/25/20  07:43    


 


MCV  71 fl (80-97)  L  09/25/20  07:43    


 


MCH  21.8 pg (27.0-33.4)  L  09/25/20  07:43    


 


MCHC  30.9 g/dL (32.0-36.0)  L  09/25/20  07:43    


 


RDW  24.4 % (11.5-14.0)  H  09/25/20  07:43    


 


Plt Count  492 10^3/uL (150-450)  H  09/25/20  07:43    


 


Lymph % (Auto)  12.3 % (13-45)  L  09/24/20  10:35    


 


Mono % (Auto)  6.4 % (3-13)   09/24/20  10:35    


 


Eos % (Auto)  0.0 % (0-6)   09/24/20  10:35    


 


Baso % (Auto)  0.2 % (0-2)   09/24/20  10:35    


 


Absolute Neuts (auto)  12.1 10^3/uL (1.7-8.2)  H  09/24/20  10:35    


 


Absolute Lymphs (auto)  1.8 10^3/uL (0.5-4.7)   09/24/20  10:35    


 


Absolute Monos (auto)  0.9 10^3/uL (0.1-1.4)   09/24/20  10:35    


 


Absolute Eos (auto)  0.0 10^3/uL (0.0-0.6)   09/24/20  10:35    


 


Absolute Basos (auto)  0.0 10^3/uL (0.0-0.2)   09/24/20  10:35    


 


Seg Neutrophils %  81.1 % (42-78)  H  09/24/20  10:35    


 


Platelet Comment  INCREASED   09/24/20  10:35    


 


Polychromasia  1+   09/24/20  10:35    


 


Poikilocytosis  1+   09/24/20  10:35    


 


Anisocytosis  3+   09/24/20  10:35    


 


Microcytosis  2+   09/24/20  10:35    


 


Spherocytes  1+   09/24/20  10:35    


 


Target Cells  SLIGHT   09/24/20  10:35    


 


Tear Drop Cells  SLIGHT   09/24/20  10:35    


 


Ovalocytes  1+   09/24/20  10:35    


 


Urine Color  YELLOW   09/24/20  07:30    


 


Urine Appearance  SLIGHTLY-CLOUDY   09/24/20  07:30    


 


Urine pH  7.0  (5.0-9.0)   09/24/20  07:30    


 


Ur Specific Gravity  1.016   09/24/20  07:30    


 


Urine Protein  NEGATIVE mg/dL (NEGATIVE)   09/24/20  07:30    


 


Urine Glucose (UA)  NEGATIVE mg/dL (NEGATIVE)   09/24/20  07:30    


 


Urine Ketones  20 mg/dL (NEGATIVE)  H  09/24/20  07:30    


 


Urine Blood  SMALL  (NEGATIVE)  H  09/24/20  07:30    


 


Urine Nitrite  NEGATIVE  (NEGATIVE)   09/24/20  07:30    


 


Urine Bilirubin  NEGATIVE  (NEGATIVE)   09/24/20  07:30    


 


Urine Urobilinogen  4.0 mg/dL (<2.0)  H  09/24/20  07:30    


 


Ur Leukocyte Esterase  MODERATE  (NEGATIVE)  H  09/24/20  07:30    


 


Urine Ascorbic Acid  NEGATIVE  (NEGATIVE)   09/24/20  07:30    


 


Fetal Membranes Rupture  NEGATIVE  (NEGATIVE)   09/24/20  07:30    


 


RPR  NONREACTIVE  (NONREACTIVE)   09/24/20  10:35    


 


Blood Type  O POSITIVE   09/24/20  10:35    


 


Antibody Screen  NEGATIVE   09/24/20  10:35    














Plan


Plan of Treatment: 


follow up in one week at [facility] for blood pressure check